# Patient Record
Sex: MALE | Race: ASIAN | Employment: FULL TIME | URBAN - METROPOLITAN AREA
[De-identification: names, ages, dates, MRNs, and addresses within clinical notes are randomized per-mention and may not be internally consistent; named-entity substitution may affect disease eponyms.]

---

## 2017-01-05 ENCOUNTER — OFFICE VISIT (OUTPATIENT)
Dept: OTHER | Facility: HOSPITAL | Age: 32
End: 2017-01-05
Attending: PREVENTIVE MEDICINE

## 2017-01-05 DIAGNOSIS — Z57.8 EMPLOYEE EXPOSURE TO BODY FLUIDS: Primary | ICD-10-CM

## 2017-01-05 LAB
HBV SURFACE AB SER QL: REACTIVE
HBV SURFACE AB SERPL IA-ACNC: 32.2 MIU/ML
HEPATITIS C VIRUS AB INTERPRETATION: NONREACTIVE
HIVS EXPOSURE ONLY: NONREACTIVE

## 2017-01-05 PROCEDURE — 86706 HEP B SURFACE ANTIBODY: CPT

## 2017-01-05 PROCEDURE — 86703 HIV-1/HIV-2 1 RESULT ANTBDY: CPT

## 2017-01-05 PROCEDURE — 86803 HEPATITIS C AB TEST: CPT

## 2017-01-05 SDOH — HEALTH STABILITY - PHYSICAL HEALTH: OCCUPATIONAL EXPOSURE TO OTHER RISK FACTORS: Z57.8

## 2017-04-30 ENCOUNTER — HOSPITAL ENCOUNTER (OUTPATIENT)
Age: 32
Discharge: HOME | End: 2017-04-30
Payer: COMMERCIAL

## 2017-04-30 VITALS
RESPIRATION RATE: 18 BRPM | DIASTOLIC BLOOD PRESSURE: 68 MMHG | HEIGHT: 71 IN | WEIGHT: 120 LBS | BODY MASS INDEX: 16.8 KG/M2 | TEMPERATURE: 99 F | SYSTOLIC BLOOD PRESSURE: 129 MMHG | OXYGEN SATURATION: 99 % | HEART RATE: 78 BPM

## 2017-04-30 DIAGNOSIS — K62.5 RECTAL BLEEDING: ICD-10-CM

## 2017-04-30 DIAGNOSIS — K64.9 HEMORRHOIDS, UNSPECIFIED HEMORRHOID TYPE: Primary | ICD-10-CM

## 2017-04-30 PROCEDURE — 99203 OFFICE O/P NEW LOW 30 MIN: CPT

## 2017-04-30 PROCEDURE — 82270 OCCULT BLOOD FECES: CPT | Performed by: PHYSICIAN ASSISTANT

## 2017-04-30 PROCEDURE — 82272 OCCULT BLD FECES 1-3 TESTS: CPT

## 2017-04-30 PROCEDURE — 36415 COLL VENOUS BLD VENIPUNCTURE: CPT

## 2017-04-30 PROCEDURE — 99213 OFFICE O/P EST LOW 20 MIN: CPT

## 2017-04-30 PROCEDURE — 85025 COMPLETE CBC W/AUTO DIFF WBC: CPT

## 2017-04-30 NOTE — ED INITIAL ASSESSMENT (HPI)
Pt reports black tarry stool for a couple of days. Also reports BRB streaks in the stools. Pt denies any recent NSAID use.

## 2017-04-30 NOTE — ED PROVIDER NOTES
Patient Seen in: [facility] Immediate Care In [facility]    History   Patient presents with:  Bleeding (hematologic)    Stated Complaint: rectal bleeding    HPI    33 yo male here with c/o 2-3 day history of blood streaked /tarry stools.  PT denies chest caroline Current:/68 mmHg  Pulse 78  Temp(Src) 98.8 °F (37.1 °C) (Temporal)  Resp 18  Ht 180.3 cm (5' 11\")  Wt 54.432 kg  BMI 16.74 kg/m2  SpO2 99%        Physical Exam   Constitutional: He is oriented to person, place, and time.  He appears well-develo MACK Ribeiro [facility] [address]  [phone]    Schedule an appointment as soon as possible for a visit  If symptoms worsen and/or for F/U    Danie Barahona MD  [address] [facility] [address]  [phone]    Schedule an appointm

## 2017-06-02 ENCOUNTER — HOSPITAL ENCOUNTER (EMERGENCY)
Facility: HOSPITAL | Age: 32
Discharge: HOME | End: 2017-06-03
Attending: EMERGENCY MEDICINE
Payer: COMMERCIAL

## 2017-06-02 ENCOUNTER — OFFICE VISIT (OUTPATIENT)
Dept: FAMILY MEDICINE CLINIC | Facility: CLINIC | Age: 32
End: 2017-06-02

## 2017-06-02 ENCOUNTER — TELEPHONE (OUTPATIENT)
Dept: FAMILY MEDICINE CLINIC | Facility: CLINIC | Age: 32
End: 2017-06-02

## 2017-06-02 VITALS
RESPIRATION RATE: 20 BRPM | HEART RATE: 84 BPM | WEIGHT: 118.5 LBS | OXYGEN SATURATION: 100 % | BODY MASS INDEX: 17 KG/M2 | DIASTOLIC BLOOD PRESSURE: 63 MMHG | SYSTOLIC BLOOD PRESSURE: 114 MMHG | TEMPERATURE: 99 F

## 2017-06-02 DIAGNOSIS — M54.41 LUMBAGO WITH SCIATICA, RIGHT SIDE: Primary | ICD-10-CM

## 2017-06-02 PROCEDURE — 96374 THER/PROPH/DIAG INJ IV PUSH: CPT

## 2017-06-02 PROCEDURE — 99213 OFFICE O/P EST LOW 20 MIN: CPT | Performed by: FAMILY MEDICINE

## 2017-06-02 PROCEDURE — 96375 TX/PRO/DX INJ NEW DRUG ADDON: CPT

## 2017-06-02 PROCEDURE — 96376 TX/PRO/DX INJ SAME DRUG ADON: CPT

## 2017-06-02 PROCEDURE — 99284 EMERGENCY DEPT VISIT MOD MDM: CPT

## 2017-06-02 PROCEDURE — 99285 EMERGENCY DEPT VISIT HI MDM: CPT

## 2017-06-02 RX ORDER — KETOROLAC TROMETHAMINE 30 MG/ML
30 INJECTION, SOLUTION INTRAMUSCULAR; INTRAVENOUS ONCE
Status: COMPLETED | OUTPATIENT
Start: 2017-06-02 | End: 2017-06-02

## 2017-06-02 RX ORDER — HYDROMORPHONE HYDROCHLORIDE 1 MG/ML
0.5 INJECTION, SOLUTION INTRAMUSCULAR; INTRAVENOUS; SUBCUTANEOUS EVERY 30 MIN PRN
Status: DISCONTINUED | OUTPATIENT
Start: 2017-06-02 | End: 2017-06-03

## 2017-06-02 RX ORDER — ONDANSETRON 2 MG/ML
4 INJECTION INTRAMUSCULAR; INTRAVENOUS EVERY 4 HOURS PRN
Status: DISCONTINUED | OUTPATIENT
Start: 2017-06-02 | End: 2017-06-03

## 2017-06-02 RX ORDER — IBUPROFEN 200 MG
200 TABLET ORAL EVERY 6 HOURS PRN
COMMUNITY
End: 2017-06-28 | Stop reason: ALTCHOICE

## 2017-06-02 RX ORDER — METHYLPREDNISOLONE 4 MG/1
TABLET ORAL
Qty: 1 KIT | Refills: 0 | Status: SHIPPED | OUTPATIENT
Start: 2017-06-02 | End: 2017-06-28 | Stop reason: ALTCHOICE

## 2017-06-02 RX ORDER — HYDROMORPHONE HYDROCHLORIDE 1 MG/ML
0.5 INJECTION, SOLUTION INTRAMUSCULAR; INTRAVENOUS; SUBCUTANEOUS ONCE
Status: DISCONTINUED | OUTPATIENT
Start: 2017-06-02 | End: 2017-06-03

## 2017-06-02 NOTE — PROGRESS NOTES
Raya Harper is a 34year old male. Patient presents with:  Low Back Pain: Last night pain started in lower back, states this happen about three years ago as well.      HPI:   Patient complaining of low back pain since last night, patient states the Elizabet Sauer was seen today for low back pain. Diagnoses and all orders for this visit:    Lumbago with sciatica, right side  -     methylPREDNISolone (MEDROL) 4 MG Oral Tablet Therapy Pack; As directed.   -     Physical Therapy Referral - Cristal Cisneros  E

## 2017-06-02 NOTE — TELEPHONE ENCOUNTER
Spoke to patient says no injury just happened yesterday. This also happened in 2014 for which he had PT and it helped. Says he took 2 Motrin advised he can take one more until appointment and can also alternate tylenol and use heat or ice.     Future Appoin

## 2017-06-03 VITALS
HEIGHT: 70 IN | SYSTOLIC BLOOD PRESSURE: 110 MMHG | DIASTOLIC BLOOD PRESSURE: 67 MMHG | WEIGHT: 120 LBS | RESPIRATION RATE: 18 BRPM | HEART RATE: 75 BPM | TEMPERATURE: 99 F | BODY MASS INDEX: 17.18 KG/M2

## 2017-06-03 NOTE — ED PROVIDER NOTES
Patient Seen in: [facility] Emergency Department    History   Patient presents with:  Back Pain (musculoskeletal)    Stated Complaint: back pain    HPI  Patient is a 39-year-old male here with back pain radiating down his right leg.   Started this mo stated in HPI.     Physical Exam       ED Triage Vitals   BP 06/02/17 2011 124/82 mmHg   Pulse 06/02/17 2011 111   Resp 06/02/17 2011 20   Temp 06/02/17 2011 99.1 °F (37.3 °C)   Temp src 06/02/17 2011 Temporal   SpO2 --    O2 Device --        Current: week  Return to the ER if you feel worse in any way, Return to the ER if you have any concerns      Medications Prescribed:  Current Discharge Medication List

## 2017-06-05 ENCOUNTER — APPOINTMENT (OUTPATIENT)
Dept: PHYSICAL THERAPY | Facility: HOSPITAL | Age: 32
End: 2017-06-05
Attending: FAMILY MEDICINE
Payer: COMMERCIAL

## 2017-06-07 ENCOUNTER — HOSPITAL ENCOUNTER (OUTPATIENT)
Dept: PHYSICAL THERAPY | Facility: HOSPITAL | Age: 32
Setting detail: THERAPIES SERIES
Discharge: HOME | End: 2017-06-07
Attending: FAMILY MEDICINE
Payer: COMMERCIAL

## 2017-06-07 DIAGNOSIS — M54.41 LUMBAGO WITH SCIATICA, RIGHT SIDE: Primary | ICD-10-CM

## 2017-06-07 PROCEDURE — 97140 MANUAL THERAPY 1/> REGIONS: CPT

## 2017-06-07 PROCEDURE — 97110 THERAPEUTIC EXERCISES: CPT

## 2017-06-07 PROCEDURE — 97161 PT EVAL LOW COMPLEX 20 MIN: CPT

## 2017-06-07 NOTE — PROGRESS NOTES
SPINE EVALUATION:   Referring Physician: Dr. Carter Cespedes  Diagnosis: Antony Bib with sciatica, right side (M54.41)     Date of Service: 6/7/2017     PATIENT SUMMARY   Meena Warren is a 34year old y/o male who presents to therapy today with complaints o coordination impairments.   Ian Biggs would benefit from skilled Physical Therapy to address the above impairments to facilitate pain-free participation in daily activities    Precautions:  None  OBJECTIVE:   Observation/Posture: unremarkable  Gait: unremarkab radicular symptoms for 3 consecutive days to improve function with ADL (10 visits)  · Pt will have decreased paraspinal mm tension for improved lumbar mobility to tolerate standing >30 minutes for work and home activities (10 visits)  · Pt will demonstrate

## 2017-06-28 ENCOUNTER — OFFICE VISIT (OUTPATIENT)
Dept: FAMILY MEDICINE CLINIC | Facility: CLINIC | Age: 32
End: 2017-06-28

## 2017-06-28 VITALS
HEART RATE: 97 BPM | OXYGEN SATURATION: 98 % | TEMPERATURE: 100 F | DIASTOLIC BLOOD PRESSURE: 58 MMHG | SYSTOLIC BLOOD PRESSURE: 102 MMHG | BODY MASS INDEX: 17 KG/M2 | WEIGHT: 119 LBS

## 2017-06-28 DIAGNOSIS — J06.9 ACUTE URI: Primary | ICD-10-CM

## 2017-06-28 PROCEDURE — 99212 OFFICE O/P EST SF 10 MIN: CPT | Performed by: FAMILY MEDICINE

## 2017-06-28 NOTE — PROGRESS NOTES
Bishop Maldonado is a 34year old male. Patient presents with:  Fever: 3 days body pain and cough along with joint pain. HPI:   Patient complaining of fever of 100 for the past 2-3 days and cough and joint pains.  Has been taking tylenol and robitussi

## 2017-07-02 ENCOUNTER — APPOINTMENT (OUTPATIENT)
Dept: GENERAL RADIOLOGY | Age: 32
End: 2017-07-02
Attending: PHYSICIAN ASSISTANT
Payer: COMMERCIAL

## 2017-07-02 ENCOUNTER — HOSPITAL ENCOUNTER (OUTPATIENT)
Age: 32
Discharge: HOME | End: 2017-07-02
Payer: COMMERCIAL

## 2017-07-02 VITALS
BODY MASS INDEX: 16.66 KG/M2 | RESPIRATION RATE: 18 BRPM | HEIGHT: 71 IN | SYSTOLIC BLOOD PRESSURE: 120 MMHG | DIASTOLIC BLOOD PRESSURE: 71 MMHG | HEART RATE: 101 BPM | OXYGEN SATURATION: 100 % | TEMPERATURE: 99 F | WEIGHT: 119 LBS

## 2017-07-02 DIAGNOSIS — J40 BRONCHITIS: Primary | ICD-10-CM

## 2017-07-02 DIAGNOSIS — J30.9 ALLERGIC RHINITIS, UNSPECIFIED ALLERGIC RHINITIS TRIGGER, UNSPECIFIED RHINITIS SEASONALITY: ICD-10-CM

## 2017-07-02 LAB — POCT RAPID STREP: NEGATIVE

## 2017-07-02 PROCEDURE — 71020 XR CHEST PA + LAT CHEST (CPT=71020): CPT | Performed by: PHYSICIAN ASSISTANT

## 2017-07-02 PROCEDURE — 94640 AIRWAY INHALATION TREATMENT: CPT

## 2017-07-02 PROCEDURE — 99214 OFFICE O/P EST MOD 30 MIN: CPT

## 2017-07-02 PROCEDURE — 87430 STREP A AG IA: CPT | Performed by: PHYSICIAN ASSISTANT

## 2017-07-02 PROCEDURE — 87081 CULTURE SCREEN ONLY: CPT | Performed by: PHYSICIAN ASSISTANT

## 2017-07-02 RX ORDER — PREDNISONE 20 MG/1
40 TABLET ORAL DAILY
Qty: 10 TABLET | Refills: 0 | Status: SHIPPED | OUTPATIENT
Start: 2017-07-02 | End: 2017-07-07

## 2017-07-02 RX ORDER — ALBUTEROL SULFATE 90 UG/1
2 AEROSOL, METERED RESPIRATORY (INHALATION) EVERY 4 HOURS PRN
Qty: 1 INHALER | Refills: 0 | Status: SHIPPED | OUTPATIENT
Start: 2017-07-02 | End: 2017-08-01

## 2017-07-02 RX ORDER — IPRATROPIUM BROMIDE AND ALBUTEROL SULFATE 2.5; .5 MG/3ML; MG/3ML
3 SOLUTION RESPIRATORY (INHALATION) ONCE
Status: COMPLETED | OUTPATIENT
Start: 2017-07-02 | End: 2017-07-02

## 2017-07-02 RX ORDER — PREDNISONE 20 MG/1
60 TABLET ORAL ONCE
Status: COMPLETED | OUTPATIENT
Start: 2017-07-02 | End: 2017-07-02

## 2017-07-02 NOTE — ED PROVIDER NOTES
Patient Seen in: [facility] In [facility]    History   Patient presents with:  Fever (infectious)  Cough/URI  Sore Throat    Stated Complaint: Fever; cough; sore throat    HPI    35 yo male here with c/o sore throat pain and a productive coug 85  Resp: 16  Temp: 98.6 °F (37 °C)  Temp src: Oral  SpO2: 99 %  O2 Device: None (Room air)    Current:/71   Pulse 101   Temp 98.6 °F (37 °C) (Oral)   Resp 18   Ht 180.3 cm (5' 11\")   Wt 54 kg   SpO2 100%   BMI 16.60 kg/m²         Physical Exam   Co Approved by: Allan Rm MD            ============================================================  ED Course  ------------------------------------------------------------  MDM     Clinical Impression:bronchitis/allergic rhinitis  Course of Treatme

## 2017-09-30 ENCOUNTER — APPOINTMENT (OUTPATIENT)
Dept: GENERAL RADIOLOGY | Age: 32
End: 2017-09-30
Attending: PHYSICIAN ASSISTANT
Payer: COMMERCIAL

## 2017-09-30 ENCOUNTER — HOSPITAL ENCOUNTER (OUTPATIENT)
Age: 32
Discharge: HOME | End: 2017-09-30
Payer: COMMERCIAL

## 2017-09-30 VITALS
SYSTOLIC BLOOD PRESSURE: 110 MMHG | TEMPERATURE: 98 F | HEART RATE: 86 BPM | DIASTOLIC BLOOD PRESSURE: 60 MMHG | OXYGEN SATURATION: 100 % | RESPIRATION RATE: 18 BRPM

## 2017-09-30 DIAGNOSIS — S92.355A NONDISPLACED FRACTURE OF FIFTH METATARSAL BONE, LEFT FOOT, INITIAL ENCOUNTER FOR CLOSED FRACTURE: Primary | ICD-10-CM

## 2017-09-30 PROCEDURE — 28470 CLTX METATARSAL FX WO MNP EA: CPT

## 2017-09-30 PROCEDURE — 99214 OFFICE O/P EST MOD 30 MIN: CPT

## 2017-09-30 PROCEDURE — 73630 X-RAY EXAM OF FOOT: CPT | Performed by: PHYSICIAN ASSISTANT

## 2017-09-30 NOTE — ED NOTES
Splint intact to left leg applied. +CMS to left toes. Cap refill <2secs. Splint checked by Barbara Thompson. Instructed pt not to remove splint (and when to remove incase of an emergency). Circulation checks and splint care explained.  Ice/motrin/elevation/fo

## 2017-09-30 NOTE — ED PROVIDER NOTES
Patient Seen in: [facility] Immediate [facility] In [place]    History   Patient presents with:  Lower Extremity Injury (musculoskeletal)    Stated Complaint: LEFT ANKLE INJURY    HPI    CHIEF COMPLAINT: Left foot injury    HISTORY OF PRESENT ILLNESS: Patient Physical Exam   ED Triage Vitals [09/30/17 1332]  BP: 111/62  Pulse: 106  Resp: 20  Temp: 97.8 °F (36.6 °C)  Temp src: Temporal  SpO2: 97 %  O2 Device: None (Room air)    Current:/62   Pulse 106   Temp 97.8 °F (36.6 °C) (Temporal)   Resp 20   SpO2 97 PROCEDURE:  XR FOOT, COMPLETE (MIN 3 VIEWS), LEFT (CPT=73630)  TECHNIQUE:  AP, oblique, and lateral views were obtained. COMPARISON:  None.   INDICATIONS:  LEFT ANKLE INJURY  PATIENT STATED HISTORY: (As transcribed by Technologist)  Patient states he twist Schedule an appointment as soon as possible for a visit in 2 days  For reevaluation      Medications Prescribed:  Current Discharge Medication List

## 2017-10-02 ENCOUNTER — TELEPHONE (OUTPATIENT)
Dept: FAMILY MEDICINE CLINIC | Facility: CLINIC | Age: 32
End: 2017-10-02

## 2017-10-02 DIAGNOSIS — S89.92XA INJURY OF LEFT LOWER EXTREMITY, INITIAL ENCOUNTER: Primary | ICD-10-CM

## 2017-10-02 PROBLEM — S92.355A CLOSED NONDISPLACED FRACTURE OF FIFTH METATARSAL BONE OF LEFT FOOT, INITIAL ENCOUNTER: Status: ACTIVE | Noted: 2017-10-02

## 2017-10-02 NOTE — TELEPHONE ENCOUNTER
Pt was seen in [address] [place] [address] on Saturday with foot injury  FINDINGS:  On the lateral view there is a lucency at the base of the fifth metatarsal bone. This is not identified on the oblique or AP views. Joint spaces are well maintained. No other bony abnormalities.

## 2017-10-07 ENCOUNTER — HOSPITAL ENCOUNTER (EMERGENCY)
Facility: HOSPITAL | Age: 32
Discharge: HOME | End: 2017-10-07
Attending: EMERGENCY MEDICINE
Payer: COMMERCIAL

## 2017-10-07 ENCOUNTER — APPOINTMENT (OUTPATIENT)
Dept: CT IMAGING | Facility: HOSPITAL | Age: 32
End: 2017-10-07
Attending: EMERGENCY MEDICINE
Payer: COMMERCIAL

## 2017-10-07 ENCOUNTER — APPOINTMENT (OUTPATIENT)
Dept: GENERAL RADIOLOGY | Facility: HOSPITAL | Age: 32
End: 2017-10-07
Attending: EMERGENCY MEDICINE
Payer: COMMERCIAL

## 2017-10-07 ENCOUNTER — HOSPITAL ENCOUNTER (OUTPATIENT)
Age: 32
Discharge: ANOTHER HOSPITAL | End: 2017-10-07
Attending: FAMILY MEDICINE
Payer: COMMERCIAL

## 2017-10-07 VITALS
DIASTOLIC BLOOD PRESSURE: 71 MMHG | RESPIRATION RATE: 16 BRPM | SYSTOLIC BLOOD PRESSURE: 125 MMHG | OXYGEN SATURATION: 100 % | HEART RATE: 70 BPM | TEMPERATURE: 98 F

## 2017-10-07 VITALS
OXYGEN SATURATION: 99 % | RESPIRATION RATE: 16 BRPM | HEART RATE: 70 BPM | DIASTOLIC BLOOD PRESSURE: 65 MMHG | HEIGHT: 71 IN | TEMPERATURE: 99 F | BODY MASS INDEX: 16.8 KG/M2 | WEIGHT: 120 LBS | SYSTOLIC BLOOD PRESSURE: 109 MMHG

## 2017-10-07 DIAGNOSIS — G44.59 OTHER COMPLICATED HEADACHE SYNDROME: Primary | ICD-10-CM

## 2017-10-07 DIAGNOSIS — G43.009 MIGRAINE WITHOUT AURA AND WITHOUT STATUS MIGRAINOSUS, NOT INTRACTABLE: Primary | ICD-10-CM

## 2017-10-07 DIAGNOSIS — H53.8 BLURRED VISION: ICD-10-CM

## 2017-10-07 DIAGNOSIS — H53.9 VISUAL CHANGES: ICD-10-CM

## 2017-10-07 PROCEDURE — 99285 EMERGENCY DEPT VISIT HI MDM: CPT

## 2017-10-07 PROCEDURE — 85025 COMPLETE CBC W/AUTO DIFF WBC: CPT | Performed by: EMERGENCY MEDICINE

## 2017-10-07 PROCEDURE — 99215 OFFICE O/P EST HI 40 MIN: CPT

## 2017-10-07 PROCEDURE — 71010 XR CHEST AP PORTABLE  (CPT=71010): CPT | Performed by: EMERGENCY MEDICINE

## 2017-10-07 PROCEDURE — 93005 ELECTROCARDIOGRAM TRACING: CPT

## 2017-10-07 PROCEDURE — 93010 ELECTROCARDIOGRAM REPORT: CPT

## 2017-10-07 PROCEDURE — 80053 COMPREHEN METABOLIC PANEL: CPT | Performed by: EMERGENCY MEDICINE

## 2017-10-07 PROCEDURE — 70450 CT HEAD/BRAIN W/O DYE: CPT | Performed by: EMERGENCY MEDICINE

## 2017-10-07 PROCEDURE — 36415 COLL VENOUS BLD VENIPUNCTURE: CPT

## 2017-10-07 NOTE — ED INITIAL ASSESSMENT (HPI)
Pt w/ blurry vision w/ headache. Blurry vision lasted 40 minutes. Slight headache remains. Was at Immediate care and sent here.

## 2017-10-07 NOTE — ED PROVIDER NOTES
Patient Seen in: [facility] In [facility]    History   Patient presents with:  Headache    Stated Complaint: h/a dizziness     HPI    Patient is a pleasant 70-year-old male.   Patient arrives for evaluation of headache with associated visual c Smokeless tobacco: Never Used                      Alcohol use: No                Review of Systems    Positive for stated complaint: h/a dizziness   Other systems are as noted in HPI.   Constitut vital signs and his cranial nerves are intact. Disposition and Plan     Clinical Impression:  Other complicated headache syndrome  (primary encounter diagnosis)  Visual changes    Disposition: Ic to ed    Follow-up:  No follow-up provider specified.

## 2017-10-07 NOTE — ED INITIAL ASSESSMENT (HPI)
Pt presents to the immediate care due to headache and vision changes. Pt states last Saturday he was seen here for a foot injury. Reports on Sunday he developed left sided neck pain he believed to be from using the crutches.  Pt states he has had the left

## 2017-10-07 NOTE — ED PROVIDER NOTES
Patient Seen in: [facility] Emergency Department    History   Patient presents with:   Eye Visual Problem (opthalmic)  Headache (neurologic)    Stated Complaint: headache    HPI    56-year-old male comes in the hospital she complained having difficul %  O2 Device: None (Room air)    Current:/69   Pulse 74   Temp 98.8 °F (37.1 °C) (Temporal)   Resp 20   Ht 180.3 cm (5' 11\")   Wt 54.4 kg   SpO2 100%   BMI 16.74 kg/m²         Physical Exam    HEENT : NCAT, EOMI, PEERL, throat clear, neck supple, no metatarsal bone. This is not identified on the oblique or AP views. Joint spaces are well maintained. No other bony abnormalities. IMPRESSION: Thin lucency that is only visualized on the lateral view at the base of fifth metatarsal bone is noted.  This is n CHEST PA + LAT CHEST (BMJ=[id number]), 7/02/2017, 12:00. INDICATIONS:  headache  PATIENT STATED HISTORY: (As transcribed by Technologist)  Headache, today. FINDINGS:  The heart is normal in size. The lungs are clear of acute-appearing disease process.   The

## 2017-12-05 ENCOUNTER — TELEPHONE (OUTPATIENT)
Dept: FAMILY MEDICINE CLINIC | Facility: CLINIC | Age: 32
End: 2017-12-05

## 2017-12-05 NOTE — TELEPHONE ENCOUNTER
Requesting an appt. Abdominal pain. Not severe. Began this am.      Pt hung up before I could Transfer to Triage . Pls call.

## 2017-12-05 NOTE — TELEPHONE ENCOUNTER
Mild abdominal pain started last night 2. Not increasing No other s/s. Will go to ER if worse. PCP informed. Wanted appointment on Thursday.      Future Appointments  Date Time Provider Gaby Toro   12/7/2017 12:30 PM Parisa Monterroso MD [facility]

## 2017-12-05 NOTE — TELEPHONE ENCOUNTER
Pt called back and did not say he had just called. Asked to schedule an appt. for abdominal pain. I explained again that I need to have the Triage Nurse triage the call so that I am scheduling the appt appropriately.   Pt declined to leave a message f

## 2017-12-07 ENCOUNTER — TELEPHONE (OUTPATIENT)
Dept: FAMILY MEDICINE CLINIC | Facility: CLINIC | Age: 32
End: 2017-12-07

## 2017-12-07 NOTE — TELEPHONE ENCOUNTER
Patient called and LVM at 9:19 am stating he needed to cancel today's visit due to being called into work. He asked us to return his call to R/S. He stated he tried to call us earlier to cancel and left a message, but we have no message.

## 2017-12-07 NOTE — TELEPHONE ENCOUNTER
Called patient and LVM that I was returning his call to R/S today's visit. This will count as a late cancel. This is his first late cancel with our clinic. No charge.

## 2017-12-12 ENCOUNTER — APPOINTMENT (OUTPATIENT)
Dept: CT IMAGING | Age: 32
End: 2017-12-12
Attending: FAMILY MEDICINE
Payer: COMMERCIAL

## 2017-12-12 ENCOUNTER — HOSPITAL ENCOUNTER (OUTPATIENT)
Age: 32
Discharge: HOME | End: 2017-12-12
Attending: FAMILY MEDICINE
Payer: COMMERCIAL

## 2017-12-12 VITALS
HEART RATE: 79 BPM | TEMPERATURE: 98 F | SYSTOLIC BLOOD PRESSURE: 111 MMHG | RESPIRATION RATE: 18 BRPM | OXYGEN SATURATION: 98 % | DIASTOLIC BLOOD PRESSURE: 70 MMHG

## 2017-12-12 DIAGNOSIS — K52.9 ENTERITIS: ICD-10-CM

## 2017-12-12 DIAGNOSIS — R10.32 LLQ ABDOMINAL PAIN: Primary | ICD-10-CM

## 2017-12-12 PROCEDURE — 36415 COLL VENOUS BLD VENIPUNCTURE: CPT

## 2017-12-12 PROCEDURE — 99214 OFFICE O/P EST MOD 30 MIN: CPT

## 2017-12-12 PROCEDURE — 85025 COMPLETE CBC W/AUTO DIFF WBC: CPT | Performed by: FAMILY MEDICINE

## 2017-12-12 PROCEDURE — 74176 CT ABD & PELVIS W/O CONTRAST: CPT | Performed by: FAMILY MEDICINE

## 2017-12-12 PROCEDURE — 81002 URINALYSIS NONAUTO W/O SCOPE: CPT | Performed by: FAMILY MEDICINE

## 2017-12-12 PROCEDURE — 80047 BASIC METABLC PNL IONIZED CA: CPT

## 2017-12-13 NOTE — ED PROVIDER NOTES
Patient Seen in: [facility] Immediate Care In [facility]    History   Patient presents with:  Abdominal Pain    Stated Complaint: abdominal pain, x3days    HPI    This 76-year-old male presents to the office with complaint of left lower quadrant abdominal p src: Temporal  SpO2: 100 %  O2 Device: None (Room air)    Current:/80   Pulse 70   Temp 98.2 °F (36.8 °C) (Temporal)   Resp 18   SpO2 100%         Physical Exam    General: WH/WN/WD, in NAD, ambulating without difficulty, A and O times 3  HEAD: Normo techniques were used. Dose information is transmitted to the [facility] [employer]) NRDR ([address]) which includes the Dose Index Registry.   PATIENT STATED HISTORY: (As transcribed by Technologist)  Patient states to ha by: Lenore Arndt MD            ED Course as of Dec 12 2059  ------------------------------------------------------------       MDM       I discussed results of laboratory and CT scan with the patient. He appears to have a nonspecific enteritis.   The patient

## 2017-12-13 NOTE — ED INITIAL ASSESSMENT (HPI)
Pt onset Sunday left lower abdominal discomfort that radiates into left testicle at times. States has begun playing volleyball and other sports recently. Yesterday pain resolved until later after noon. Today began again 30 minutes after waking.   Point s

## 2017-12-29 ENCOUNTER — APPOINTMENT (OUTPATIENT)
Dept: GENERAL RADIOLOGY | Age: 32
End: 2017-12-29
Attending: PHYSICIAN ASSISTANT
Payer: COMMERCIAL

## 2017-12-29 ENCOUNTER — HOSPITAL ENCOUNTER (OUTPATIENT)
Age: 32
Discharge: HOME | End: 2017-12-29
Payer: COMMERCIAL

## 2017-12-29 VITALS
TEMPERATURE: 98 F | HEIGHT: 70 IN | DIASTOLIC BLOOD PRESSURE: 84 MMHG | BODY MASS INDEX: 17.18 KG/M2 | HEART RATE: 107 BPM | RESPIRATION RATE: 18 BRPM | OXYGEN SATURATION: 100 % | SYSTOLIC BLOOD PRESSURE: 118 MMHG | WEIGHT: 120 LBS

## 2017-12-29 DIAGNOSIS — R09.89 FOREIGN BODY SENSATION IN THROAT: Primary | ICD-10-CM

## 2017-12-29 DIAGNOSIS — R05.3 COUGH, PERSISTENT: ICD-10-CM

## 2017-12-29 PROCEDURE — 99213 OFFICE O/P EST LOW 20 MIN: CPT

## 2017-12-29 PROCEDURE — 71020 XR CHEST PA + LAT CHEST (CPT=71020): CPT | Performed by: PHYSICIAN ASSISTANT

## 2017-12-29 NOTE — ED INITIAL ASSESSMENT (HPI)
Pt began yesterday when he swallowed rice the wrong way, and began to cough,  Then last night got severe body aches and felt feverish, and continued to cough.   Pt still feels like there might be something in his throat

## 2017-12-29 NOTE — ED PROVIDER NOTES
Patient Seen in: [facility] Immediate Care In [facility]    History   Patient presents with:  Cough/URI    Stated Complaint: COUGH/URI    HPI    79-year-old male here with complaints of a 1 day history of potential foreign body sensation in his throat and l 17.22 kg/m²         Physical Exam   Constitutional: He is oriented to person, place, and time. He appears well-developed and well-nourished. HENT:   Head: Normocephalic and atraumatic.    Right Ear: Tympanic membrane, external ear and ear canal normal. take an over-the-counter antihistamine daily i.e. Allegra, Claritin and or Zyrtec. If symptoms persist contact your primary care physician for referral to pulmonology.       The patient is in good condition thru out treatment today and remains so upon disc

## 2018-01-02 ENCOUNTER — APPOINTMENT (OUTPATIENT)
Dept: GENERAL RADIOLOGY | Age: 33
End: 2018-01-02
Attending: FAMILY MEDICINE
Payer: COMMERCIAL

## 2018-01-02 ENCOUNTER — HOSPITAL ENCOUNTER (OUTPATIENT)
Age: 33
Discharge: HOME | End: 2018-01-02
Attending: FAMILY MEDICINE
Payer: COMMERCIAL

## 2018-01-02 VITALS
HEIGHT: 70 IN | BODY MASS INDEX: 22.9 KG/M2 | RESPIRATION RATE: 18 BRPM | HEART RATE: 93 BPM | WEIGHT: 160 LBS | OXYGEN SATURATION: 100 % | TEMPERATURE: 98 F | DIASTOLIC BLOOD PRESSURE: 88 MMHG | SYSTOLIC BLOOD PRESSURE: 144 MMHG

## 2018-01-02 DIAGNOSIS — J11.1 INFLUENZA: Primary | ICD-10-CM

## 2018-01-02 PROCEDURE — 71046 X-RAY EXAM CHEST 2 VIEWS: CPT | Performed by: FAMILY MEDICINE

## 2018-01-02 PROCEDURE — 99213 OFFICE O/P EST LOW 20 MIN: CPT

## 2018-01-02 PROCEDURE — 99214 OFFICE O/P EST MOD 30 MIN: CPT

## 2018-01-02 RX ORDER — OSELTAMIVIR PHOSPHATE 75 MG/1
75 CAPSULE ORAL 2 TIMES DAILY
Qty: 10 CAPSULE | Refills: 0 | Status: SHIPPED | OUTPATIENT
Start: 2018-01-02 | End: 2018-01-07

## 2018-01-02 RX ORDER — BENZONATATE 200 MG/1
200 CAPSULE ORAL 3 TIMES DAILY PRN
Qty: 30 CAPSULE | Refills: 0 | Status: SHIPPED | OUTPATIENT
Start: 2018-01-02 | End: 2018-02-01

## 2018-01-02 NOTE — ED PROVIDER NOTES
Patient Seen in: [facility] Immediate Care In [facility]    History   Patient presents with:  Fever (infectious)  Cough/URI    Stated Complaint: fever ha    HPI    28year old male presents for fever and headache.  Patient states he has abrupt onset of fever Nose normal.   Mouth/Throat: Oropharynx is clear and moist and mucous membranes are normal.   Eyes: Conjunctivae and EOM are normal. Pupils are equal, round, and reactive to light. Neck: Normal range of motion. Neck supple.    Cardiovascular: Normal rate,

## 2018-03-31 ENCOUNTER — OFFICE VISIT (OUTPATIENT)
Dept: OTOLARYNGOLOGY | Facility: CLINIC | Age: 33
End: 2018-03-31

## 2018-03-31 VITALS
DIASTOLIC BLOOD PRESSURE: 71 MMHG | HEART RATE: 71 BPM | SYSTOLIC BLOOD PRESSURE: 115 MMHG | BODY MASS INDEX: 17.5 KG/M2 | HEIGHT: 71 IN | WEIGHT: 125 LBS | RESPIRATION RATE: 16 BRPM

## 2018-03-31 DIAGNOSIS — J35.8 TONSIL STONE: Primary | ICD-10-CM

## 2018-03-31 PROCEDURE — 99243 OFF/OP CNSLTJ NEW/EST LOW 30: CPT | Performed by: OTOLARYNGOLOGY

## 2018-03-31 PROCEDURE — 99212 OFFICE O/P EST SF 10 MIN: CPT | Performed by: OTOLARYNGOLOGY

## 2018-03-31 NOTE — PROGRESS NOTES
Lani Ferrara is a 28year old male. Patient presents with:  Throat Problem: Per pt tonsils stones, denied pain in his throat    HPI:   He has been experiencing problems with tonsil stone intermittently over the last month.   Sometimes when he gets [facility] Normal Orientation - Oriented to time, place, person & situation. Appropriate mood and affect. Lymph Detail Normal Submental. Submandibular. Anterior cervical. Posterior cervical. Supraclavicular.    Eyes Normal Conjunctiva - Right: Normal, Left: Normal.

## 2018-05-24 ENCOUNTER — APPOINTMENT (OUTPATIENT)
Dept: GENERAL RADIOLOGY | Facility: HOSPITAL | Age: 33
End: 2018-05-24
Attending: EMERGENCY MEDICINE
Payer: COMMERCIAL

## 2018-05-24 ENCOUNTER — HOSPITAL ENCOUNTER (OUTPATIENT)
Age: 33
Discharge: ANOTHER HOSPITAL | End: 2018-05-24
Payer: COMMERCIAL

## 2018-05-24 ENCOUNTER — HOSPITAL ENCOUNTER (EMERGENCY)
Facility: HOSPITAL | Age: 33
Discharge: HOME | End: 2018-05-24
Attending: EMERGENCY MEDICINE
Payer: COMMERCIAL

## 2018-05-24 VITALS
BODY MASS INDEX: 17.5 KG/M2 | OXYGEN SATURATION: 100 % | TEMPERATURE: 98 F | HEART RATE: 63 BPM | WEIGHT: 125 LBS | SYSTOLIC BLOOD PRESSURE: 114 MMHG | DIASTOLIC BLOOD PRESSURE: 72 MMHG | HEIGHT: 71 IN | RESPIRATION RATE: 16 BRPM

## 2018-05-24 VITALS
RESPIRATION RATE: 16 BRPM | SYSTOLIC BLOOD PRESSURE: 121 MMHG | TEMPERATURE: 98 F | OXYGEN SATURATION: 98 % | DIASTOLIC BLOOD PRESSURE: 71 MMHG | HEART RATE: 87 BPM

## 2018-05-24 DIAGNOSIS — M54.59 INTRACTABLE LOW BACK PAIN: Primary | ICD-10-CM

## 2018-05-24 DIAGNOSIS — M54.50 ACUTE MIDLINE LOW BACK PAIN WITHOUT SCIATICA: Primary | ICD-10-CM

## 2018-05-24 PROCEDURE — 72110 X-RAY EXAM L-2 SPINE 4/>VWS: CPT | Performed by: EMERGENCY MEDICINE

## 2018-05-24 PROCEDURE — 96376 TX/PRO/DX INJ SAME DRUG ADON: CPT

## 2018-05-24 PROCEDURE — 99284 EMERGENCY DEPT VISIT MOD MDM: CPT

## 2018-05-24 PROCEDURE — 96374 THER/PROPH/DIAG INJ IV PUSH: CPT

## 2018-05-24 PROCEDURE — 96372 THER/PROPH/DIAG INJ SC/IM: CPT

## 2018-05-24 PROCEDURE — 99214 OFFICE O/P EST MOD 30 MIN: CPT

## 2018-05-24 PROCEDURE — 96375 TX/PRO/DX INJ NEW DRUG ADDON: CPT

## 2018-05-24 PROCEDURE — 81003 URINALYSIS AUTO W/O SCOPE: CPT | Performed by: EMERGENCY MEDICINE

## 2018-05-24 RX ORDER — HYDROMORPHONE HYDROCHLORIDE 1 MG/ML
0.5 INJECTION, SOLUTION INTRAMUSCULAR; INTRAVENOUS; SUBCUTANEOUS EVERY 30 MIN PRN
Status: DISCONTINUED | OUTPATIENT
Start: 2018-05-24 | End: 2018-05-24

## 2018-05-24 RX ORDER — ONDANSETRON 2 MG/ML
4 INJECTION INTRAMUSCULAR; INTRAVENOUS ONCE
Status: COMPLETED | OUTPATIENT
Start: 2018-05-24 | End: 2018-05-24

## 2018-05-24 RX ORDER — KETOROLAC TROMETHAMINE 30 MG/ML
30 INJECTION, SOLUTION INTRAMUSCULAR; INTRAVENOUS ONCE
Status: COMPLETED | OUTPATIENT
Start: 2018-05-24 | End: 2018-05-24

## 2018-05-24 RX ORDER — PREDNISONE 20 MG/1
60 TABLET ORAL ONCE
Status: COMPLETED | OUTPATIENT
Start: 2018-05-24 | End: 2018-05-24

## 2018-05-24 RX ORDER — METHYLPREDNISOLONE 4 MG/1
TABLET ORAL
Qty: 1 PACKAGE | Refills: 0 | Status: SHIPPED | OUTPATIENT
Start: 2018-05-24 | End: 2018-05-29

## 2018-05-24 NOTE — ED INITIAL ASSESSMENT (HPI)
Patient presents with cc of low back pain from about an hour prior to admit when he bent down to  his child and tweaked his lower back into spasm. No numbness or tingling noted. Denies bowel/bladder incontinence.

## 2018-05-24 NOTE — ED INITIAL ASSESSMENT (HPI)
Patient bent down to  his daughter and experienced low back pain he went to the immediate care after  Attempting home remedies (heat,bengay) They gave him toradol and told him to come here

## 2018-05-24 NOTE — ED PROVIDER NOTES
Patient Seen in: [facility] Emergency Department    History   Patient presents with:  Back Pain (musculoskeletal)    Stated Complaint: Back pain while picking up daughter.  SEnt to ER from urgent care for pain contr*    HPI    This is a 68-year-old m nontoxic appearing. SKIN: Normal, warm, and dry. HEENT:  Pupils equally round and reactive to light. Conjuctiva clear. Oropharynx is clear and moist.   Lungs: Clear to auscultation bilaterally with no rales, no retractions, and no wheezing.   HEART:  Re orally. X-rays of the lumbar spine do not show any acute process. Suspect a lumbar radiculopathy. Recommend close outpatient follow-up with primary care and/or orthopedics. He was started on a Medrol Dosepak, Tylenol for pain.   He declined a Norco.  He

## 2018-05-24 NOTE — ED PROVIDER NOTES
Patient Seen in: [address]    History   Patient presents with:  Low Back Pain    Stated Complaint: back pain, xtoday     HPI    Patient is a 78-year-old male with a medical history of anorexia and chronic underweight.   Just simona is intact. DTRs are appropriate and equal.  Abdominal: Soft exam.  No distention. No pain to palpation all 4 quadrants. No evidence of hernia to the inguinal region  Back: Patient is sitting very erect in room.   He has right Paralumbar spasm to palpatio

## 2018-05-30 ENCOUNTER — APPOINTMENT (OUTPATIENT)
Dept: PHYSICAL THERAPY | Age: 33
End: 2018-05-30
Attending: FAMILY MEDICINE
Payer: COMMERCIAL

## 2018-05-30 ENCOUNTER — OFFICE VISIT (OUTPATIENT)
Dept: FAMILY MEDICINE CLINIC | Facility: CLINIC | Age: 33
End: 2018-05-30

## 2018-05-30 ENCOUNTER — OFFICE VISIT (OUTPATIENT)
Dept: PHYSICAL THERAPY | Age: 33
End: 2018-05-30
Attending: FAMILY MEDICINE
Payer: COMMERCIAL

## 2018-05-30 VITALS
WEIGHT: 122 LBS | DIASTOLIC BLOOD PRESSURE: 64 MMHG | RESPIRATION RATE: 16 BRPM | SYSTOLIC BLOOD PRESSURE: 102 MMHG | OXYGEN SATURATION: 99 % | BODY MASS INDEX: 17.66 KG/M2 | HEART RATE: 80 BPM | HEIGHT: 69.5 IN | TEMPERATURE: 98 F

## 2018-05-30 DIAGNOSIS — M54.41 ACUTE BILATERAL LOW BACK PAIN WITH RIGHT-SIDED SCIATICA: ICD-10-CM

## 2018-05-30 DIAGNOSIS — M62.830 SPASM OF MUSCLE OF LOWER BACK: ICD-10-CM

## 2018-05-30 DIAGNOSIS — M54.41 ACUTE BILATERAL LOW BACK PAIN WITH RIGHT-SIDED SCIATICA: Primary | ICD-10-CM

## 2018-05-30 PROCEDURE — 97110 THERAPEUTIC EXERCISES: CPT

## 2018-05-30 PROCEDURE — 99213 OFFICE O/P EST LOW 20 MIN: CPT | Performed by: FAMILY MEDICINE

## 2018-05-30 PROCEDURE — 97161 PT EVAL LOW COMPLEX 20 MIN: CPT

## 2018-05-30 RX ORDER — CYCLOBENZAPRINE HCL 10 MG
10 TABLET ORAL NIGHTLY
Qty: 30 TABLET | Refills: 0 | Status: SHIPPED | OUTPATIENT
Start: 2018-05-30 | End: 2018-06-29

## 2018-05-30 NOTE — PROGRESS NOTES
Akanksha Covarrubias is a 28year old male. Patient presents with:  ER F/U: Pt went to er for back going out about a week ago was trying to  his daughter and his back went out, patient was given pain medication.     HPI:   Patient is seen for follow-u for er f/u. Diagnoses and all orders for this visit:    Acute bilateral low back pain with right-sided sciatica  -     OP REFERRAL TO [facility]    Spasm of muscle of lower back  -     Cyclobenzaprine HCl 10 MG Oral Tab;  Take 1 tabl

## 2018-05-30 NOTE — PROGRESS NOTES
INITIAL EVALUATION:   Referring Physician: Dr. Alonzo Calero  Diagnosis: Acute bilateral low back pain with right-sided sciatica (M54.41)  Spasm of muscle of lower back (W86.840)       Date of Service: 5/30/2018     PATIENT SUMMARY/ASSESSMENT   Stacy Sims lordosis    Gait: The patient ambulates with a rigid and slightly forward flexed posture    ROM:  Flexion:  Moderate limitations  Extension: Severe limitations  Sidebending: R Minimal limitations; L Minimal limitations  Rotation: R WFL; L WFL    Accessory m treatment options and has agreed to actively participate in planning and for this course of care. Thank you for your referral. Please co-sign or sign and return this letter via fax as soon as possible to [phone].  If you have any questions, please c

## 2018-07-30 ENCOUNTER — OFFICE VISIT (OUTPATIENT)
Dept: FAMILY MEDICINE CLINIC | Facility: CLINIC | Age: 33
End: 2018-07-30
Payer: COMMERCIAL

## 2018-07-30 VITALS
SYSTOLIC BLOOD PRESSURE: 118 MMHG | TEMPERATURE: 98 F | WEIGHT: 122.25 LBS | HEART RATE: 76 BPM | DIASTOLIC BLOOD PRESSURE: 76 MMHG | RESPIRATION RATE: 16 BRPM | OXYGEN SATURATION: 98 % | BODY MASS INDEX: 18 KG/M2

## 2018-07-30 DIAGNOSIS — R22.32 LOCALIZED SWELLING, MASS, OR LUMP OF LEFT UPPER EXTREMITY: Primary | ICD-10-CM

## 2018-07-30 PROCEDURE — 99212 OFFICE O/P EST SF 10 MIN: CPT | Performed by: FAMILY MEDICINE

## 2018-07-30 NOTE — PROGRESS NOTES
Faith Cage is a 28year old male.   Patient presents with:  Arm Pain: Lump on right arm and tender to touch notoced a couple weeks ago then went away and now has come back     HPI:   Patient complaining that he noticed a small lump/swelling, soft in

## 2019-07-05 ENCOUNTER — HOSPITAL ENCOUNTER (OUTPATIENT)
Age: 34
Discharge: HOME | End: 2019-07-05
Attending: FAMILY MEDICINE
Payer: COMMERCIAL

## 2019-07-05 VITALS
OXYGEN SATURATION: 100 % | SYSTOLIC BLOOD PRESSURE: 129 MMHG | TEMPERATURE: 98 F | RESPIRATION RATE: 16 BRPM | DIASTOLIC BLOOD PRESSURE: 76 MMHG | HEART RATE: 72 BPM

## 2019-07-05 DIAGNOSIS — H92.02 LEFT EAR PAIN: Primary | ICD-10-CM

## 2019-07-05 DIAGNOSIS — R59.0 LYMPHADENOPATHY, PREAURICULAR: ICD-10-CM

## 2019-07-05 PROCEDURE — 99214 OFFICE O/P EST MOD 30 MIN: CPT

## 2019-07-05 PROCEDURE — 99213 OFFICE O/P EST LOW 20 MIN: CPT

## 2019-07-05 RX ORDER — METHYLPREDNISOLONE 4 MG/1
TABLET ORAL
Qty: 1 PACKAGE | Refills: 0 | Status: SHIPPED | OUTPATIENT
Start: 2019-07-05 | End: 2019-11-01

## 2019-07-06 NOTE — ED PROVIDER NOTES
Patient Seen in: [facility] Immediate Care In [facility]    History   Patient presents with:  Ear Problem Pain (neurosensory)    Stated Complaint: BILATERAL EAR PAIN/HEADACHE X 7 DAYS    HPI    27year old male presents for bilateral ear pain and headache. membrane, external ear and ear canal normal.   Nose: Nose normal.   Mouth/Throat: Uvula is midline, oropharynx is clear and moist and mucous membranes are normal.   Eyes: Pupils are equal, round, and reactive to light.  Conjunctivae, EOM and lids are normal

## 2019-11-01 ENCOUNTER — HOSPITAL ENCOUNTER (OUTPATIENT)
Age: 34
Discharge: HOME | End: 2019-11-01
Attending: FAMILY MEDICINE
Payer: COMMERCIAL

## 2019-11-01 VITALS
BODY MASS INDEX: 17.5 KG/M2 | TEMPERATURE: 99 F | OXYGEN SATURATION: 100 % | HEIGHT: 71 IN | WEIGHT: 125 LBS | RESPIRATION RATE: 20 BRPM | HEART RATE: 79 BPM | DIASTOLIC BLOOD PRESSURE: 70 MMHG | SYSTOLIC BLOOD PRESSURE: 125 MMHG

## 2019-11-01 DIAGNOSIS — J02.9 ACUTE VIRAL PHARYNGITIS: Primary | ICD-10-CM

## 2019-11-01 PROCEDURE — 87081 CULTURE SCREEN ONLY: CPT | Performed by: FAMILY MEDICINE

## 2019-11-01 PROCEDURE — 99214 OFFICE O/P EST MOD 30 MIN: CPT

## 2019-11-01 PROCEDURE — 87430 STREP A AG IA: CPT | Performed by: FAMILY MEDICINE

## 2019-11-01 RX ORDER — PREDNISONE 20 MG/1
40 TABLET ORAL DAILY
Qty: 6 TABLET | Refills: 0 | Status: SHIPPED | OUTPATIENT
Start: 2019-11-01 | End: 2019-11-04

## 2019-11-01 RX ORDER — BENZONATATE 100 MG/1
100 CAPSULE ORAL 3 TIMES DAILY PRN
Qty: 30 CAPSULE | Refills: 0 | Status: SHIPPED | OUTPATIENT
Start: 2019-11-01 | End: 2019-12-01

## 2019-11-01 NOTE — ED PROVIDER NOTES
Patient Seen in: [facility] In [facility]      History   Patient presents with:  Sore Throat    Stated Complaint: FEVER/SORE THROAT X 3 DAYS    HPI    41-year-old male presents the IC secondary to sore throat and fever.   Patient developed sym clear.   Nose: Bilateral nares are clear. Mouth: MMM. Posterior pharynx with bilateral tonsils +1. Erythematous tonsils with white exudate. Uvula is midline. Neck: Supple, NT, FROM. No meningeal signs.   LAD: Bilateral tender submandibular lymphadenopat

## 2020-01-13 ENCOUNTER — HOSPITAL ENCOUNTER (OUTPATIENT)
Age: 35
Discharge: HOME | End: 2020-01-13
Attending: FAMILY MEDICINE
Payer: COMMERCIAL

## 2020-01-13 VITALS
DIASTOLIC BLOOD PRESSURE: 68 MMHG | WEIGHT: 125 LBS | SYSTOLIC BLOOD PRESSURE: 99 MMHG | BODY MASS INDEX: 17.5 KG/M2 | TEMPERATURE: 98 F | RESPIRATION RATE: 19 BRPM | HEIGHT: 71 IN | HEART RATE: 101 BPM | OXYGEN SATURATION: 98 %

## 2020-01-13 DIAGNOSIS — J11.1 INFLUENZA: Primary | ICD-10-CM

## 2020-01-13 DIAGNOSIS — J02.9 SORE THROAT: ICD-10-CM

## 2020-01-13 LAB — POCT RAPID STREP: NEGATIVE

## 2020-01-13 PROCEDURE — 87081 CULTURE SCREEN ONLY: CPT | Performed by: FAMILY MEDICINE

## 2020-01-13 PROCEDURE — 87430 STREP A AG IA: CPT | Performed by: FAMILY MEDICINE

## 2020-01-13 PROCEDURE — 99214 OFFICE O/P EST MOD 30 MIN: CPT

## 2020-01-13 RX ORDER — FLUTICASONE PROPIONATE 50 MCG
SPRAY, SUSPENSION (ML) NASAL
Qty: 1 INHALER | Refills: 0 | Status: SHIPPED | OUTPATIENT
Start: 2020-01-13

## 2020-01-13 RX ORDER — BENZONATATE 200 MG/1
200 CAPSULE ORAL 3 TIMES DAILY PRN
Qty: 30 CAPSULE | Refills: 0 | Status: SHIPPED | OUTPATIENT
Start: 2020-01-13 | End: 2020-01-23

## 2020-01-13 NOTE — ED INITIAL ASSESSMENT (HPI)
3 days of sore throat  Body aches  Fever  Productive cough  Sinus pressure and congestion  Runny nose

## 2020-01-13 NOTE — ED PROVIDER NOTES
Patient Seen in: [facility]      History   Patient presents with:  Sore Throat    Stated Complaint: cough,sore throat,fever    HPI    This 69-year-old male presents to the office with complaint of sore throat, fever, chills, sin normal.  NOSE: Turbinates congested, no bleeding noted. PHARYNX:  Erythema is noted, no exudates seen, post nasal drip is noted, airway patent, uvula midline  NECK:  Shotty anterior cervical lymphadenopathy.  No thyromegaly,  HEART: Regular rate and rhythm least the next 2 weeks or as long as you are congested. Push fluids. Humidified air. Alternate Tylenol with ibuprofen every 3 hours while awake for fever or pain. Go to the emergency room if you have increased difficulty breathing.   Follow-up with your

## 2020-11-13 ENCOUNTER — HOSPITAL ENCOUNTER (OUTPATIENT)
Dept: GENERAL RADIOLOGY | Age: 35
Discharge: HOME | End: 2020-11-13
Attending: FAMILY MEDICINE

## 2020-11-13 ENCOUNTER — OCC HEALTH (OUTPATIENT)
Dept: OCCUPATIONAL MEDICINE | Age: 35
End: 2020-11-13
Attending: FAMILY MEDICINE

## 2020-11-13 DIAGNOSIS — M54.9 BACK PAIN: ICD-10-CM

## 2020-11-13 DIAGNOSIS — M54.9 BACK PAIN: Primary | ICD-10-CM

## 2020-11-13 PROCEDURE — 72072 X-RAY EXAM THORAC SPINE 3VWS: CPT | Performed by: FAMILY MEDICINE

## 2020-11-13 PROCEDURE — 71046 X-RAY EXAM CHEST 2 VIEWS: CPT | Performed by: FAMILY MEDICINE

## 2020-11-13 RX ORDER — CYCLOBENZAPRINE HCL 10 MG
10 TABLET ORAL NIGHTLY
Qty: 30 TABLET | Refills: 0 | Status: SHIPPED | OUTPATIENT
Start: 2020-11-13 | End: 2020-12-13

## 2020-11-13 RX ORDER — NAPROXEN 500 MG/1
500 TABLET ORAL 2 TIMES DAILY
Qty: 30 TABLET | Refills: 1 | Status: SHIPPED | OUTPATIENT
Start: 2020-11-13 | End: 2020-12-13

## 2020-12-17 ENCOUNTER — IMMUNIZATION (OUTPATIENT)
Dept: LAB | Facility: HOSPITAL | Age: 35
End: 2020-12-17
Attending: PREVENTIVE MEDICINE
Payer: COMMERCIAL

## 2020-12-17 DIAGNOSIS — Z23 NEED FOR VACCINATION: ICD-10-CM

## 2020-12-17 PROCEDURE — 0001A PFIZER-BIONTECH COVID-19 VACCINE: CPT

## 2021-01-07 ENCOUNTER — IMMUNIZATION (OUTPATIENT)
Dept: LAB | Facility: HOSPITAL | Age: 36
End: 2021-01-07
Attending: PREVENTIVE MEDICINE

## 2021-01-07 DIAGNOSIS — Z23 NEED FOR VACCINATION: ICD-10-CM

## 2021-01-07 PROCEDURE — 0002A SARSCOV2 VAC 30MCG/0.3ML IM: CPT

## 2021-05-20 ENCOUNTER — HOSPITAL ENCOUNTER (EMERGENCY)
Facility: HOSPITAL | Age: 36
Discharge: HOME | End: 2021-05-20
Attending: EMERGENCY MEDICINE

## 2021-05-20 VITALS
HEIGHT: 71 IN | BODY MASS INDEX: 20.3 KG/M2 | RESPIRATION RATE: 16 BRPM | OXYGEN SATURATION: 98 % | WEIGHT: 145 LBS | TEMPERATURE: 98 F | HEART RATE: 72 BPM | DIASTOLIC BLOOD PRESSURE: 81 MMHG | SYSTOLIC BLOOD PRESSURE: 128 MMHG

## 2021-05-20 DIAGNOSIS — M54.32 SCIATICA OF LEFT SIDE: Primary | ICD-10-CM

## 2021-05-20 PROCEDURE — 99282 EMERGENCY DEPT VISIT SF MDM: CPT

## 2021-05-20 RX ORDER — IBUPROFEN 200 MG
200 TABLET ORAL ONCE
Status: COMPLETED | OUTPATIENT
Start: 2021-05-20 | End: 2021-05-20

## 2021-05-20 RX ORDER — ACETAMINOPHEN 500 MG
500 TABLET ORAL ONCE
Status: COMPLETED | OUTPATIENT
Start: 2021-05-20 | End: 2021-05-20

## 2021-05-20 RX ORDER — KETOROLAC TROMETHAMINE 30 MG/ML
60 INJECTION, SOLUTION INTRAMUSCULAR; INTRAVENOUS ONCE
Status: DISCONTINUED | OUTPATIENT
Start: 2021-05-20 | End: 2021-05-20

## 2021-05-20 NOTE — ED PROVIDER NOTES
Patient Seen in: [facility] Emergency Department      History   Patient presents with:  Back Pain    Stated Complaint: left lower back pain radiates to left leg    HPI/Subjective:   HPI    56-year-old male who presents to the emergency department co Resp 16   Ht 180.3 cm (5' 11\")   Wt 65.8 kg   SpO2 100%   BMI 20.22 kg/m²         Physical Exam  General: This a pleasant nontoxic appearing patient in no apparent distress alert and oriented ×3  HEENT: His head is atraumatic normocephalic.   Pupils are r Kenroy Irene  [phone]    Schedule an appointment as soon as possible for a visit in 1 week            Medications Prescribed:  Current Discharge Medication List

## 2021-05-20 NOTE — ED QUICK NOTES
Pt reevaluated by er physician.  Informed of all his test reports and plan of care verbalizing understanding

## 2021-05-20 NOTE — ED INITIAL ASSESSMENT (HPI)
Pt states he worked with a lot of walking yesterday, here for Left lower back pain radiating to his leg. Denies injury.

## 2021-10-01 ENCOUNTER — IMMUNIZATION (OUTPATIENT)
Dept: LAB | Facility: HOSPITAL | Age: 36
End: 2021-10-01
Attending: EMERGENCY MEDICINE
Payer: COMMERCIAL

## 2021-10-01 DIAGNOSIS — Z23 NEED FOR VACCINATION: Primary | ICD-10-CM

## 2021-10-01 PROCEDURE — 0003A SARSCOV2 VAC 30MCG/0.3ML IM: CPT

## 2021-10-01 PROCEDURE — 0004A SARSCOV2 VAC 30MCG/0.3ML IM: CPT

## 2022-02-23 ENCOUNTER — HOSPITAL ENCOUNTER (EMERGENCY)
Facility: HOSPITAL | Age: 37
Discharge: HOME | End: 2022-02-23
Attending: PEDIATRICS
Payer: COMMERCIAL

## 2022-02-23 VITALS
BODY MASS INDEX: 20 KG/M2 | WEIGHT: 143 LBS | HEART RATE: 90 BPM | DIASTOLIC BLOOD PRESSURE: 77 MMHG | OXYGEN SATURATION: 100 % | RESPIRATION RATE: 18 BRPM | SYSTOLIC BLOOD PRESSURE: 123 MMHG | TEMPERATURE: 98 F
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
## 2022-02-23 DIAGNOSIS — S81.851A DOG BITE OF RIGHT LOWER LEG, INITIAL ENCOUNTER: Primary | ICD-10-CM

## 2022-02-23 PROCEDURE — 99283 EMERGENCY DEPT VISIT LOW MDM: CPT

## 2022-02-23 PROCEDURE — 90471 IMMUNIZATION ADMIN: CPT

## 2022-06-10 ENCOUNTER — APPOINTMENT (OUTPATIENT)
Dept: GENERAL RADIOLOGY | Facility: HOSPITAL | Age: 37
End: 2022-06-10
Attending: EMERGENCY MEDICINE
Payer: COMMERCIAL

## 2022-06-10 ENCOUNTER — HOSPITAL ENCOUNTER (EMERGENCY)
Facility: HOSPITAL | Age: 37
Discharge: HOME | End: 2022-06-10
Attending: EMERGENCY MEDICINE
Payer: COMMERCIAL

## 2022-06-10 VITALS
RESPIRATION RATE: 15 BRPM | WEIGHT: 140 LBS | DIASTOLIC BLOOD PRESSURE: 81 MMHG | TEMPERATURE: 98 F | SYSTOLIC BLOOD PRESSURE: 125 MMHG | BODY MASS INDEX: 20.04 KG/M2 | HEART RATE: 74 BPM | HEIGHT: 70 IN | OXYGEN SATURATION: 100 %

## 2022-06-10 DIAGNOSIS — R07.81 RIB PAIN ON LEFT SIDE: Primary | ICD-10-CM

## 2022-06-10 LAB
ALBUMIN SERPL-MCNC: 3.7 G/DL (ref 3.4–5)
ALBUMIN/GLOB SERPL: 0.9 {RATIO} (ref 1–2)
ALP LIVER SERPL-CCNC: 82 U/L
ALT SERPL-CCNC: 29 U/L
ANION GAP SERPL CALC-SCNC: 4 MMOL/L (ref 0–18)
AST SERPL-CCNC: 18 U/L (ref 15–37)
BASOPHILS # BLD AUTO: 0.05 X10(3) UL (ref 0–0.2)
BASOPHILS NFR BLD AUTO: 0.5 %
BILIRUB SERPL-MCNC: 0.4 MG/DL (ref 0.1–2)
BUN BLD-MCNC: 18 MG/DL (ref 7–18)
CALCIUM BLD-MCNC: 9.1 MG/DL (ref 8.5–10.1)
CHLORIDE SERPL-SCNC: 105 MMOL/L (ref 98–112)
CO2 SERPL-SCNC: 29 MMOL/L (ref 21–32)
CREAT BLD-MCNC: 1.22 MG/DL
D DIMER PPP FEU-MCNC: <0.27 UG/ML FEU (ref ?–0.5)
EOSINOPHIL # BLD AUTO: 0.31 X10(3) UL (ref 0–0.7)
EOSINOPHIL NFR BLD AUTO: 3.4 %
ERYTHROCYTE [DISTWIDTH] IN BLOOD BY AUTOMATED COUNT: 13.8 %
GLOBULIN PLAS-MCNC: 4 G/DL (ref 2.8–4.4)
GLUCOSE BLD-MCNC: 122 MG/DL (ref 70–99)
HCT VFR BLD AUTO: 45.7 %
HGB BLD-MCNC: 14.1 G/DL
IMM GRANULOCYTES # BLD AUTO: 0.02 X10(3) UL (ref 0–1)
IMM GRANULOCYTES NFR BLD: 0.2 %
LYMPHOCYTES # BLD AUTO: 2.41 X10(3) UL (ref 1–4)
LYMPHOCYTES NFR BLD AUTO: 26.3 %
MCH RBC QN AUTO: 25.4 PG (ref 26–34)
MCHC RBC AUTO-ENTMCNC: 30.9 G/DL (ref 31–37)
MCV RBC AUTO: 82.3 FL
MONOCYTES # BLD AUTO: 0.48 X10(3) UL (ref 0.1–1)
MONOCYTES NFR BLD AUTO: 5.2 %
NEUTROPHILS # BLD AUTO: 5.9 X10 (3) UL (ref 1.5–7.7)
NEUTROPHILS # BLD AUTO: 5.9 X10(3) UL (ref 1.5–7.7)
NEUTROPHILS NFR BLD AUTO: 64.4 %
OSMOLALITY SERPL CALC.SUM OF ELEC: 289 MOSM/KG (ref 275–295)
PLATELET # BLD AUTO: 170 10(3)UL (ref 150–450)
POTASSIUM SERPL-SCNC: 3.9 MMOL/L (ref 3.5–5.1)
PROT SERPL-MCNC: 7.7 G/DL (ref 6.4–8.2)
RBC # BLD AUTO: 5.55 X10(6)UL
SODIUM SERPL-SCNC: 138 MMOL/L (ref 136–145)
WBC # BLD AUTO: 9.2 X10(3) UL (ref 4–11)

## 2022-06-10 PROCEDURE — 71046 X-RAY EXAM CHEST 2 VIEWS: CPT | Performed by: EMERGENCY MEDICINE

## 2022-06-10 PROCEDURE — 99284 EMERGENCY DEPT VISIT MOD MDM: CPT

## 2022-06-10 PROCEDURE — 80053 COMPREHEN METABOLIC PANEL: CPT | Performed by: EMERGENCY MEDICINE

## 2022-06-10 PROCEDURE — 85025 COMPLETE CBC W/AUTO DIFF WBC: CPT | Performed by: EMERGENCY MEDICINE

## 2022-06-10 PROCEDURE — 85379 FIBRIN DEGRADATION QUANT: CPT | Performed by: EMERGENCY MEDICINE

## 2022-06-10 PROCEDURE — 96374 THER/PROPH/DIAG INJ IV PUSH: CPT

## 2022-06-10 RX ORDER — KETOROLAC TROMETHAMINE 30 MG/ML
15 INJECTION, SOLUTION INTRAMUSCULAR; INTRAVENOUS ONCE
Status: COMPLETED | OUTPATIENT
Start: 2022-06-10 | End: 2022-06-10

## 2022-06-10 RX ORDER — HYDROCODONE BITARTRATE AND ACETAMINOPHEN 5; 325 MG/1; MG/1
1 TABLET ORAL ONCE
Status: COMPLETED | OUTPATIENT
Start: 2022-06-10 | End: 2022-06-10

## 2022-06-10 RX ORDER — HYDROCODONE BITARTRATE AND ACETAMINOPHEN 5; 325 MG/1; MG/1
1-2 TABLET ORAL EVERY 6 HOURS PRN
Qty: 10 TABLET | Refills: 0 | Status: SHIPPED | OUTPATIENT
Start: 2022-06-10 | End: 2022-06-17

## 2022-06-10 NOTE — ED INITIAL ASSESSMENT (HPI)
Pt reports left sided rib pain that started Wednesday afternoon. Pt reports pain with movement and when he takes a deep breath. Pt reports that he tried tylenol and lidocaine patch with no relief, pt reports pain has gotten worse since Wednesday.

## 2022-06-23 ENCOUNTER — LAB ENCOUNTER (OUTPATIENT)
Dept: LAB | Age: 37
End: 2022-06-23
Attending: FAMILY MEDICINE
Payer: COMMERCIAL

## 2022-06-23 ENCOUNTER — OFFICE VISIT (OUTPATIENT)
Dept: FAMILY MEDICINE CLINIC | Facility: CLINIC | Age: 37
End: 2022-06-23
Payer: COMMERCIAL

## 2022-06-23 VITALS
BODY MASS INDEX: 20.19 KG/M2 | OXYGEN SATURATION: 100 % | RESPIRATION RATE: 18 BRPM | SYSTOLIC BLOOD PRESSURE: 100 MMHG | DIASTOLIC BLOOD PRESSURE: 80 MMHG | HEIGHT: 70 IN | HEART RATE: 71 BPM | WEIGHT: 141 LBS | TEMPERATURE: 98 F

## 2022-06-23 DIAGNOSIS — Z00.00 ROUTINE GENERAL MEDICAL EXAMINATION AT A HEALTH CARE FACILITY: Primary | ICD-10-CM

## 2022-06-23 DIAGNOSIS — Z00.00 ROUTINE GENERAL MEDICAL EXAMINATION AT A HEALTH CARE FACILITY: ICD-10-CM

## 2022-06-23 DIAGNOSIS — E55.9 VITAMIN D DEFICIENCY: ICD-10-CM

## 2022-06-23 LAB
CHOLEST SERPL-MCNC: 173 MG/DL (ref ?–200)
FASTING PATIENT LIPID ANSWER: YES
HDLC SERPL-MCNC: 32 MG/DL (ref 40–59)
LDLC SERPL CALC-MCNC: 111 MG/DL (ref ?–100)
NONHDLC SERPL-MCNC: 141 MG/DL (ref ?–130)
TRIGL SERPL-MCNC: 169 MG/DL (ref 30–149)
TSI SER-ACNC: 1.51 MIU/ML (ref 0.36–3.74)
VIT D+METAB SERPL-MCNC: 44.9 NG/ML (ref 30–100)
VLDLC SERPL CALC-MCNC: 29 MG/DL (ref 0–30)

## 2022-06-23 PROCEDURE — 99385 PREV VISIT NEW AGE 18-39: CPT | Performed by: FAMILY MEDICINE

## 2022-06-23 PROCEDURE — 3079F DIAST BP 80-89 MM HG: CPT | Performed by: FAMILY MEDICINE

## 2022-06-23 PROCEDURE — 36415 COLL VENOUS BLD VENIPUNCTURE: CPT

## 2022-06-23 PROCEDURE — 82306 VITAMIN D 25 HYDROXY: CPT

## 2022-06-23 PROCEDURE — 3074F SYST BP LT 130 MM HG: CPT | Performed by: FAMILY MEDICINE

## 2022-06-23 PROCEDURE — 80061 LIPID PANEL: CPT

## 2022-06-23 PROCEDURE — 3008F BODY MASS INDEX DOCD: CPT | Performed by: FAMILY MEDICINE

## 2022-06-23 PROCEDURE — 84443 ASSAY THYROID STIM HORMONE: CPT

## 2022-06-23 RX ORDER — BENZONATATE 100 MG/1
100 CAPSULE ORAL 2 TIMES DAILY PRN
COMMUNITY
Start: 2022-06-17

## 2022-06-24 ENCOUNTER — HOSPITAL ENCOUNTER (EMERGENCY)
Age: 37
Discharge: HOME | End: 2022-06-24
Payer: COMMERCIAL

## 2022-06-24 VITALS
OXYGEN SATURATION: 100 % | WEIGHT: 142 LBS | BODY MASS INDEX: 20 KG/M2 | SYSTOLIC BLOOD PRESSURE: 131 MMHG | HEART RATE: 90 BPM | TEMPERATURE: 98 F | RESPIRATION RATE: 18 BRPM | DIASTOLIC BLOOD PRESSURE: 64 MMHG

## 2022-06-24 DIAGNOSIS — M54.41 ACUTE RIGHT-SIDED LOW BACK PAIN WITH RIGHT-SIDED SCIATICA: Primary | ICD-10-CM

## 2022-06-24 PROCEDURE — 99283 EMERGENCY DEPT VISIT LOW MDM: CPT

## 2022-06-24 PROCEDURE — 96372 THER/PROPH/DIAG INJ SC/IM: CPT

## 2022-06-24 RX ORDER — CYCLOBENZAPRINE HCL 10 MG
10 TABLET ORAL ONCE
Status: COMPLETED | OUTPATIENT
Start: 2022-06-24 | End: 2022-06-24

## 2022-06-24 RX ORDER — CYCLOBENZAPRINE HCL 10 MG
10 TABLET ORAL 3 TIMES DAILY PRN
Qty: 20 TABLET | Refills: 0 | Status: SHIPPED | OUTPATIENT
Start: 2022-06-24 | End: 2022-07-01

## 2022-06-24 RX ORDER — METHYLPREDNISOLONE 4 MG/1
TABLET ORAL
Qty: 1 EACH | Refills: 0 | Status: SHIPPED | OUTPATIENT
Start: 2022-06-24

## 2022-06-24 RX ORDER — KETOROLAC TROMETHAMINE 30 MG/ML
60 INJECTION, SOLUTION INTRAMUSCULAR; INTRAVENOUS ONCE
Status: COMPLETED | OUTPATIENT
Start: 2022-06-24 | End: 2022-06-24

## 2022-06-24 NOTE — ED INITIAL ASSESSMENT (HPI)
Pt states that he has back pain after riding a bike for 10 miles. Pt denies any other injury.  Pt states that he has tried Motrin, tylenol, norco and lidocaine patch with no relief

## 2022-06-28 ENCOUNTER — HOSPITAL ENCOUNTER (OUTPATIENT)
Age: 37
Discharge: HOME | End: 2022-06-28
Attending: EMERGENCY MEDICINE
Payer: COMMERCIAL

## 2022-06-28 ENCOUNTER — APPOINTMENT (OUTPATIENT)
Dept: GENERAL RADIOLOGY | Age: 37
End: 2022-06-28
Attending: NURSE PRACTITIONER
Payer: COMMERCIAL

## 2022-06-28 VITALS
TEMPERATURE: 97 F | WEIGHT: 142 LBS | OXYGEN SATURATION: 99 % | DIASTOLIC BLOOD PRESSURE: 65 MMHG | HEART RATE: 80 BPM | RESPIRATION RATE: 18 BRPM | BODY MASS INDEX: 20.33 KG/M2 | SYSTOLIC BLOOD PRESSURE: 113 MMHG | HEIGHT: 70 IN

## 2022-06-28 DIAGNOSIS — R07.89 CHEST PAIN, NON-CARDIAC: Primary | ICD-10-CM

## 2022-06-28 LAB
#MXD IC: 0.4 X10ˆ3/UL (ref 0.1–1)
ATRIAL RATE: 68 BPM
BASOPHILS # BLD AUTO: 0.05 X10(3) UL (ref 0–0.2)
BASOPHILS NFR BLD AUTO: 0.7 %
BUN BLD-MCNC: 20 MG/DL (ref 7–18)
CHLORIDE BLD-SCNC: 101 MMOL/L (ref 98–112)
CO2 BLD-SCNC: 30 MMOL/L (ref 21–32)
CREAT BLD-MCNC: 1 MG/DL
DDIMER WHOLE BLOOD: <200 NG/ML DDU (ref ?–400)
EOSINOPHIL # BLD AUTO: 0.17 X10(3) UL (ref 0–0.7)
EOSINOPHIL NFR BLD AUTO: 2.3 %
ERYTHROCYTE [DISTWIDTH] IN BLOOD BY AUTOMATED COUNT: 13.5 %
GLUCOSE BLD-MCNC: 84 MG/DL (ref 70–99)
HCT VFR BLD AUTO: 44.6 %
HCT VFR BLD AUTO: 45 %
HCT VFR BLD CALC: 46 %
HGB BLD-MCNC: 13.7 G/DL
HGB BLD-MCNC: 14.1 G/DL
IMM GRANULOCYTES # BLD AUTO: 0.03 X10(3) UL (ref 0–1)
IMM GRANULOCYTES NFR BLD: 0.4 %
ISTAT IONIZED CALCIUM FOR CHEM 8: 1.21 MMOL/L (ref 1.12–1.32)
LYMPHOCYTES # BLD AUTO: 2.67 X10(3) UL (ref 1–4)
LYMPHOCYTES # BLD AUTO: 2.8 X10ˆ3/UL (ref 1–4)
LYMPHOCYTES NFR BLD AUTO: 35.6 %
LYMPHOCYTES NFR BLD AUTO: 38.5 %
MCH RBC QN AUTO: 25 PG (ref 26–34)
MCH RBC QN AUTO: 25.5 PG (ref 26–34)
MCHC RBC AUTO-ENTMCNC: 30.7 G/DL (ref 31–37)
MCHC RBC AUTO-ENTMCNC: 31.3 G/DL (ref 31–37)
MCV RBC AUTO: 81.2 FL
MCV RBC AUTO: 81.2 FL (ref 80–100)
MIXED CELL %: 5.7 %
MONOCYTES # BLD AUTO: 0.45 X10(3) UL (ref 0.1–1)
MONOCYTES NFR BLD AUTO: 6 %
NEUTROPHILS # BLD AUTO: 4.14 X10 (3) UL (ref 1.5–7.7)
NEUTROPHILS # BLD AUTO: 4.14 X10(3) UL (ref 1.5–7.7)
NEUTROPHILS # BLD AUTO: 4.2 X10ˆ3/UL (ref 1.5–7.7)
NEUTROPHILS NFR BLD AUTO: 55 %
NEUTROPHILS NFR BLD AUTO: 55.8 %
P AXIS: 66 DEGREES
P-R INTERVAL: 160 MS
PLATELET # BLD AUTO: 179 10(3)UL (ref 150–450)
POTASSIUM BLD-SCNC: 4.4 MMOL/L (ref 3.6–5.1)
Q-T INTERVAL: 348 MS
QRS DURATION: 90 MS
QTC CALCULATION (BEZET): 370 MS
R AXIS: 108 DEGREES
RBC # BLD AUTO: 5.49 X10ˆ6/UL
RBC # BLD AUTO: 5.54 X10(6)UL
SODIUM BLD-SCNC: 140 MMOL/L (ref 136–145)
T AXIS: 61 DEGREES
TROPONIN I BLD-MCNC: <0.02 NG/ML
VENTRICULAR RATE: 68 BPM
WBC # BLD AUTO: 7.4 X10ˆ3/UL (ref 4–11)
WBC # BLD AUTO: 7.5 X10(3) UL (ref 4–11)

## 2022-06-28 PROCEDURE — 84484 ASSAY OF TROPONIN QUANT: CPT

## 2022-06-28 PROCEDURE — 85025 COMPLETE CBC W/AUTO DIFF WBC: CPT | Performed by: NURSE PRACTITIONER

## 2022-06-28 PROCEDURE — 85378 FIBRIN DEGRADE SEMIQUANT: CPT | Performed by: NURSE PRACTITIONER

## 2022-06-28 PROCEDURE — 80047 BASIC METABLC PNL IONIZED CA: CPT

## 2022-06-28 PROCEDURE — 93005 ELECTROCARDIOGRAM TRACING: CPT

## 2022-06-28 PROCEDURE — 36415 COLL VENOUS BLD VENIPUNCTURE: CPT

## 2022-06-28 PROCEDURE — 99215 OFFICE O/P EST HI 40 MIN: CPT

## 2022-06-28 PROCEDURE — 71046 X-RAY EXAM CHEST 2 VIEWS: CPT | Performed by: NURSE PRACTITIONER

## 2022-06-28 PROCEDURE — 99213 OFFICE O/P EST LOW 20 MIN: CPT

## 2022-06-28 PROCEDURE — 93010 ELECTROCARDIOGRAM REPORT: CPT

## 2022-10-31 ENCOUNTER — TELEPHONE (OUTPATIENT)
Dept: FAMILY MEDICINE CLINIC | Facility: CLINIC | Age: 37
End: 2022-10-31

## 2022-10-31 NOTE — TELEPHONE ENCOUNTER
Returned call to patient who states he is not sick, feels fine. Has questions regarding lab values. Questions answered. Reviewed lab result note per Dr. Timbo Sneed and stressed healthy diet and exercise.

## 2023-02-10 ENCOUNTER — HOSPITAL ENCOUNTER (OUTPATIENT)
Age: 38
Discharge: HOME | End: 2023-02-10
Payer: COMMERCIAL

## 2023-02-10 VITALS
BODY MASS INDEX: 21.47 KG/M2 | TEMPERATURE: 99 F | DIASTOLIC BLOOD PRESSURE: 53 MMHG | WEIGHT: 150 LBS | OXYGEN SATURATION: 100 % | SYSTOLIC BLOOD PRESSURE: 108 MMHG | HEART RATE: 80 BPM | RESPIRATION RATE: 18 BRPM | HEIGHT: 70 IN

## 2023-02-10 DIAGNOSIS — Z51.89 VISIT FOR WOUND CHECK: Primary | ICD-10-CM

## 2023-02-10 PROCEDURE — 99212 OFFICE O/P EST SF 10 MIN: CPT

## 2023-05-04 ENCOUNTER — OFFICE VISIT (OUTPATIENT)
Dept: FAMILY MEDICINE CLINIC | Facility: CLINIC | Age: 38
End: 2023-05-04
Payer: COMMERCIAL

## 2023-05-04 ENCOUNTER — HOSPITAL ENCOUNTER (OUTPATIENT)
Dept: GENERAL RADIOLOGY | Age: 38
Discharge: HOME | End: 2023-05-04
Attending: FAMILY MEDICINE
Payer: COMMERCIAL

## 2023-05-04 VITALS
WEIGHT: 145 LBS | HEIGHT: 70 IN | RESPIRATION RATE: 16 BRPM | HEART RATE: 76 BPM | DIASTOLIC BLOOD PRESSURE: 70 MMHG | OXYGEN SATURATION: 99 % | BODY MASS INDEX: 20.76 KG/M2 | TEMPERATURE: 98 F | SYSTOLIC BLOOD PRESSURE: 100 MMHG

## 2023-05-04 DIAGNOSIS — Z00.00 LABORATORY EXAM ORDERED AS PART OF ROUTINE GENERAL MEDICAL EXAMINATION: ICD-10-CM

## 2023-05-04 DIAGNOSIS — M79.644 CHRONIC PAIN OF RIGHT THUMB: Primary | ICD-10-CM

## 2023-05-04 DIAGNOSIS — M79.644 CHRONIC PAIN OF RIGHT THUMB: ICD-10-CM

## 2023-05-04 DIAGNOSIS — G89.29 CHRONIC BILATERAL LOW BACK PAIN WITH RIGHT-SIDED SCIATICA: ICD-10-CM

## 2023-05-04 DIAGNOSIS — G89.29 CHRONIC PAIN OF RIGHT THUMB: ICD-10-CM

## 2023-05-04 DIAGNOSIS — M54.41 CHRONIC BILATERAL LOW BACK PAIN WITH RIGHT-SIDED SCIATICA: ICD-10-CM

## 2023-05-04 DIAGNOSIS — G89.29 CHRONIC PAIN OF RIGHT THUMB: Primary | ICD-10-CM

## 2023-05-04 PROCEDURE — 3008F BODY MASS INDEX DOCD: CPT | Performed by: FAMILY MEDICINE

## 2023-05-04 PROCEDURE — 3078F DIAST BP <80 MM HG: CPT | Performed by: FAMILY MEDICINE

## 2023-05-04 PROCEDURE — 99213 OFFICE O/P EST LOW 20 MIN: CPT | Performed by: FAMILY MEDICINE

## 2023-05-04 PROCEDURE — 73140 X-RAY EXAM OF FINGER(S): CPT | Performed by: FAMILY MEDICINE

## 2023-05-04 PROCEDURE — 3074F SYST BP LT 130 MM HG: CPT | Performed by: FAMILY MEDICINE

## 2023-06-08 ENCOUNTER — LAB ENCOUNTER (OUTPATIENT)
Dept: LAB | Age: 38
End: 2023-06-08
Attending: FAMILY MEDICINE
Payer: COMMERCIAL

## 2023-06-08 DIAGNOSIS — Z00.00 LABORATORY EXAM ORDERED AS PART OF ROUTINE GENERAL MEDICAL EXAMINATION: ICD-10-CM

## 2023-06-08 LAB
ALBUMIN SERPL-MCNC: 4.2 G/DL (ref 3.4–5)
ALBUMIN/GLOB SERPL: 1.1 {RATIO} (ref 1–2)
ALP LIVER SERPL-CCNC: 71 U/L
ALT SERPL-CCNC: 30 U/L
ANION GAP SERPL CALC-SCNC: 2 MMOL/L (ref 0–18)
AST SERPL-CCNC: 20 U/L (ref 15–37)
BASOPHILS # BLD AUTO: 0.04 X10(3) UL (ref 0–0.2)
BASOPHILS NFR BLD AUTO: 0.5 %
BILIRUB SERPL-MCNC: 0.4 MG/DL (ref 0.1–2)
BUN BLD-MCNC: 18 MG/DL (ref 7–18)
CALCIUM BLD-MCNC: 9.3 MG/DL (ref 8.5–10.1)
CHLORIDE SERPL-SCNC: 106 MMOL/L (ref 98–112)
CHOLEST SERPL-MCNC: 205 MG/DL (ref ?–200)
CO2 SERPL-SCNC: 29 MMOL/L (ref 21–32)
CREAT BLD-MCNC: 1.05 MG/DL
EOSINOPHIL # BLD AUTO: 0.32 X10(3) UL (ref 0–0.7)
EOSINOPHIL NFR BLD AUTO: 4.2 %
ERYTHROCYTE [DISTWIDTH] IN BLOOD BY AUTOMATED COUNT: 14.6 %
FASTING PATIENT LIPID ANSWER: YES
FASTING STATUS PATIENT QL REPORTED: YES
GFR SERPLBLD BASED ON 1.73 SQ M-ARVRAT: 93 ML/MIN/1.73M2 (ref 60–?)
GLOBULIN PLAS-MCNC: 3.8 G/DL (ref 2.8–4.4)
GLUCOSE BLD-MCNC: 88 MG/DL (ref 70–99)
HCT VFR BLD AUTO: 47.9 %
HDLC SERPL-MCNC: 42 MG/DL (ref 40–59)
HGB BLD-MCNC: 14.5 G/DL
IMM GRANULOCYTES # BLD AUTO: 0.02 X10(3) UL (ref 0–1)
IMM GRANULOCYTES NFR BLD: 0.3 %
LDLC SERPL CALC-MCNC: 141 MG/DL (ref ?–100)
LYMPHOCYTES # BLD AUTO: 2.74 X10(3) UL (ref 1–4)
LYMPHOCYTES NFR BLD AUTO: 35.5 %
MCH RBC QN AUTO: 25.1 PG (ref 26–34)
MCHC RBC AUTO-ENTMCNC: 30.3 G/DL (ref 31–37)
MCV RBC AUTO: 82.9 FL
MONOCYTES # BLD AUTO: 0.47 X10(3) UL (ref 0.1–1)
MONOCYTES NFR BLD AUTO: 6.1 %
NEUTROPHILS # BLD AUTO: 4.12 X10 (3) UL (ref 1.5–7.7)
NEUTROPHILS # BLD AUTO: 4.12 X10(3) UL (ref 1.5–7.7)
NEUTROPHILS NFR BLD AUTO: 53.4 %
NONHDLC SERPL-MCNC: 163 MG/DL (ref ?–130)
OSMOLALITY SERPL CALC.SUM OF ELEC: 285 MOSM/KG (ref 275–295)
PLATELET # BLD AUTO: 175 10(3)UL (ref 150–450)
POTASSIUM SERPL-SCNC: 4.7 MMOL/L (ref 3.5–5.1)
PROT SERPL-MCNC: 8 G/DL (ref 6.4–8.2)
RBC # BLD AUTO: 5.78 X10(6)UL
SODIUM SERPL-SCNC: 137 MMOL/L (ref 136–145)
TRIGL SERPL-MCNC: 124 MG/DL (ref 30–149)
TSI SER-ACNC: 1.97 MIU/ML (ref 0.36–3.74)
VLDLC SERPL CALC-MCNC: 23 MG/DL (ref 0–30)
WBC # BLD AUTO: 7.7 X10(3) UL (ref 4–11)

## 2023-06-08 PROCEDURE — 84443 ASSAY THYROID STIM HORMONE: CPT

## 2023-06-08 PROCEDURE — 80061 LIPID PANEL: CPT

## 2023-06-08 PROCEDURE — 36415 COLL VENOUS BLD VENIPUNCTURE: CPT

## 2023-06-08 PROCEDURE — 80053 COMPREHEN METABOLIC PANEL: CPT

## 2023-06-08 PROCEDURE — 85025 COMPLETE CBC W/AUTO DIFF WBC: CPT

## 2023-06-16 ENCOUNTER — APPOINTMENT (OUTPATIENT)
Dept: GENERAL RADIOLOGY | Age: 38
End: 2023-06-16
Attending: STUDENT IN AN ORGANIZED HEALTH CARE EDUCATION/TRAINING PROGRAM
Payer: COMMERCIAL

## 2023-06-16 ENCOUNTER — HOSPITAL ENCOUNTER (OUTPATIENT)
Age: 38
Discharge: HOME | End: 2023-06-16
Attending: STUDENT IN AN ORGANIZED HEALTH CARE EDUCATION/TRAINING PROGRAM
Payer: COMMERCIAL

## 2023-06-16 VITALS
SYSTOLIC BLOOD PRESSURE: 125 MMHG | TEMPERATURE: 98 F | DIASTOLIC BLOOD PRESSURE: 67 MMHG | OXYGEN SATURATION: 99 % | RESPIRATION RATE: 16 BRPM | HEART RATE: 73 BPM

## 2023-06-16 DIAGNOSIS — M47.816 FACET ARTHRITIS OF LUMBAR REGION: Primary | ICD-10-CM

## 2023-06-16 DIAGNOSIS — S33.5XXA SPRAIN OF LOW BACK, INITIAL ENCOUNTER: ICD-10-CM

## 2023-06-16 PROCEDURE — 72100 X-RAY EXAM L-S SPINE 2/3 VWS: CPT | Performed by: STUDENT IN AN ORGANIZED HEALTH CARE EDUCATION/TRAINING PROGRAM

## 2023-06-16 PROCEDURE — 99214 OFFICE O/P EST MOD 30 MIN: CPT

## 2023-06-16 PROCEDURE — 96372 THER/PROPH/DIAG INJ SC/IM: CPT

## 2023-06-16 RX ORDER — HYDROCODONE BITARTRATE AND ACETAMINOPHEN 5; 325 MG/1; MG/1
1 TABLET ORAL ONCE
Status: COMPLETED | OUTPATIENT
Start: 2023-06-16 | End: 2023-06-16

## 2023-06-16 RX ORDER — CYCLOBENZAPRINE HCL 10 MG
10 TABLET ORAL 3 TIMES DAILY PRN
Qty: 20 TABLET | Refills: 0 | Status: SHIPPED | OUTPATIENT
Start: 2023-06-16 | End: 2023-06-23

## 2023-06-16 RX ORDER — OXYCODONE HYDROCHLORIDE AND ACETAMINOPHEN 5; 325 MG/1; MG/1
1 TABLET ORAL EVERY 6 HOURS PRN
Qty: 10 TABLET | Refills: 0 | Status: SHIPPED | OUTPATIENT
Start: 2023-06-16 | End: 2023-06-21

## 2023-06-16 RX ORDER — KETOROLAC TROMETHAMINE 30 MG/ML
30 INJECTION, SOLUTION INTRAMUSCULAR; INTRAVENOUS ONCE
Status: COMPLETED | OUTPATIENT
Start: 2023-06-16 | End: 2023-06-16

## 2023-06-16 NOTE — ED INITIAL ASSESSMENT (HPI)
Patient c/o back pain primarily right sided  Does not radiate without urinary symptoms. Pain with movement had difficulty getting out of car.  Does not recall specific movement that triggered pain had done a 10mile bike ride but that is not unusual.

## 2023-06-16 NOTE — DISCHARGE INSTRUCTIONS
You can do home exercises for low back to help stretch out the lumbar spine. In addition you can do gluteal lift exercises as well as exercises to help strengthen your transverse abdominis.

## 2023-06-19 ENCOUNTER — OFFICE VISIT (OUTPATIENT)
Dept: PHYSICAL THERAPY | Age: 38
End: 2023-06-19
Attending: FAMILY MEDICINE
Payer: COMMERCIAL

## 2023-06-19 ENCOUNTER — TELEPHONE (OUTPATIENT)
Dept: PHYSICAL THERAPY | Facility: HOSPITAL | Age: 38
End: 2023-06-19

## 2023-06-19 DIAGNOSIS — M54.50 ACUTE EXACERBATION OF CHRONIC LOW BACK PAIN: Primary | ICD-10-CM

## 2023-06-19 DIAGNOSIS — G89.29 ACUTE EXACERBATION OF CHRONIC LOW BACK PAIN: Primary | ICD-10-CM

## 2023-06-19 DIAGNOSIS — M54.16 LUMBAR RADICULOPATHY: ICD-10-CM

## 2023-06-19 PROCEDURE — 97161 PT EVAL LOW COMPLEX 20 MIN: CPT

## 2023-06-19 PROCEDURE — 97110 THERAPEUTIC EXERCISES: CPT

## 2023-06-20 ENCOUNTER — OFFICE VISIT (OUTPATIENT)
Dept: PHYSICAL THERAPY | Age: 38
End: 2023-06-20
Attending: FAMILY MEDICINE
Payer: COMMERCIAL

## 2023-06-20 DIAGNOSIS — M54.50 ACUTE EXACERBATION OF CHRONIC LOW BACK PAIN: Primary | ICD-10-CM

## 2023-06-20 DIAGNOSIS — M54.16 LUMBAR RADICULOPATHY: ICD-10-CM

## 2023-06-20 DIAGNOSIS — G89.29 ACUTE EXACERBATION OF CHRONIC LOW BACK PAIN: Primary | ICD-10-CM

## 2023-06-20 PROCEDURE — 97110 THERAPEUTIC EXERCISES: CPT

## 2023-06-23 ENCOUNTER — TELEPHONE (OUTPATIENT)
Dept: PHYSICAL THERAPY | Facility: HOSPITAL | Age: 38
End: 2023-06-23

## 2023-06-28 ENCOUNTER — TELEPHONE (OUTPATIENT)
Dept: PHYSICAL THERAPY | Facility: HOSPITAL | Age: 38
End: 2023-06-28

## 2023-06-30 ENCOUNTER — APPOINTMENT (OUTPATIENT)
Dept: PHYSICAL THERAPY | Age: 38
End: 2023-06-30
Attending: FAMILY MEDICINE
Payer: COMMERCIAL

## 2023-07-05 ENCOUNTER — OFFICE VISIT (OUTPATIENT)
Dept: PHYSICAL THERAPY | Facility: HOSPITAL | Age: 38
End: 2023-07-05
Attending: FAMILY MEDICINE
Payer: COMMERCIAL

## 2023-07-05 PROCEDURE — 97110 THERAPEUTIC EXERCISES: CPT

## 2023-07-05 PROCEDURE — 97112 NEUROMUSCULAR REEDUCATION: CPT

## 2023-07-05 NOTE — PROGRESS NOTES
PT DAILY NOTE  Diagnosis:   Acute exacerbation of chronic LBP (M54.5,G89.29) , lumbar radiculopathy (M54.16)    Referring Provider: Sarai Young MD  Date of Evaluation:    6/19/2023    Precautions:  None Next MD visit:   none scheduled  Date of Surgery: n/a  Symptom onset: 6-15-23     Insurance Primary/Secondary: BCBS II O     Visit 2 of 8   Date POC Expires: 8-23-23       Subjective: Back is much better. Not having pain going into front of thigh, has not had since first few days. Reports gets this pain every 5-6 months, about 10 years. Past few times was back of thigh, this time was front. Usually bike 10-15 miles, almost every day. Reports day prior to pain, heard crack in inner thigh when getting off bike. Has biked 1x 10 miles last week and went okay. Pain: denies at present, describes as tightness  @eval: Kashmir Clifford is a 43year old male who presents to therapy today with complaints of LBP and Rt sciatica for 10 yrs, comes and goes. Latest flareup started 4 days ago; pain in Rt low back and into Rt anterior thigh. He typically bikes 10-15 miles every day. 4 days ago he did this no problem, but when walking later in the day, felt some pain in his Rt back and inner thigh. Then the next day (3 days ago) he had a hard time getting out of the car 2o pain, went to immediate care, gave him Norco and Flexeril and had injection in deltoid. He stopped taking both med 2o not yelping. However, he states his pain overall is improving since it onset. He feels he cannot stand up straight, but this improves if he puts both hands on low back. He had PT for this 10 yrs ago and it helped. Pain does not awaken him overnight. Pain c sitting and c walking. Has stopped bike riding right now 2o this pain. Pain c flexion ADLs. Denies any N/T. 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    Current functional limitations include difficulty standing upright, decr walking quality/tolerance, UA to ride bicycle, difficulty c flexion-based ADLs, pain c sitting. Paddy Prom describes prior level of function full and painfree. Pt goals include resolve pain, incr walking tolerance c upright posture and s pain, painfree ADLs. Past medical history was reviewed with Paddy Prom. Significant findings include migraine, anorexia. Pt denies diplopia, dysarthria, dysphasia, dizziness, drop attacks, bowel/bladder changes, saddle anesthesia, and RAJAT LE N/T. Objective:    (7/5/2023)  Lumbar ROM:   Flexion: 60 (tightness center back)  Extension: hinges L5/S1   Side flexion: WNL and painfree  Rotation: WNL     Psoas palpation: significant tenderness B, does not tolerate well     SLR: 65 B     Flexibility:   *max restrictions B psoas  *90/90 HS: 32 away from 0 B     Hip screen ROM: WNL IR/ER/flexion  *scour -         Assessment: Patient overall less irritability since evaluation. No longer with leg pain, some intermittent back stiffness. Re-assessment as this is my first session with patient, seen by PT at another clinic and transfer d/t location. Notable APT in standing and increased lordosis. Some pain/tightness end range flexion and closing facet posterior quadrants. Significant psoas restrictions, going into extension with passive hip extension prior to neutral. Significant tenderness with attempts palpation-held. Added psoas stretch with neutral pelvic tilt, abd brace 90/90, and cat to neutral to help improve neutral spine positioning. Plan: progress neutral spine core strength; re-assess psoas palpation  Date: 6/19/23  TX#: 2/ Date:             7/5/2023    TX#: 3/ Date:                 TX#: 4/ Date:                 TX#: 5/ Date: Tx#: 6/   Therapeutic Exercise:    Added treadmill walk at self-selected speed (2.0mph) for incr walking tolerance 5min  WAYNE 5\"x15 (increased)  SKTC 20\"x3 ea Rt, Lf   LTR 2min  Added bridges 5\"x10  Added hooklying core stab c minimarch 1minx2  Added S/L clamshell 5\"x10 ea Rt, Lf   Added 3way child's pose stretch 10\"x2 ea direction Therapeutic Exercise:   Re-assessment  Hip flexor psoas kneeling stretch 2 x 30s ea         Neuro Re-Ed:  Bridges working on neutral spine 15x  90/90 abd brace taps alt x 5 ea   Quadruped hip ext 8 ea   Discussed standing posture, trials more neutral tilt          Manual Therapy  SL opening technique R target L4/5 G3                  HEP:   Hip flexor psoas stretch 3 x 30s ea, 2x daily  90/90 brace taps 2 sets  Cat to neutral 10x 3s, 2x daily    PLAN OF CARE:    Goals: (to be met in 8 visits)   1. Centralize pain to low back to indicate decr irritability and severity of symtpoms  2. Consistently decr pain < or = 2/10 intermittent for incr QOL and activity tolerance  3. Overall incr in function as indicated by decr Oswestry <10%  4. painfree full AROM lumbar spine to allow painfree ADLs and upright walking posture  5. Pt able to ambulate at least 1 mile s gait deficits c little to no pain for PLOF  6. Pt able to sit at least 30 min bouts c proper support and posture s pain for PLOF  7. incr RLE MMT at least 1/2 grade painfree throughout for incr mm support for WB activities  8. indep HEP to promote cont progress toward functional goals    Frequency / Duration: Patient will be seen for 1-2 x/week or a total of up to 8 visits over a 90 day period.        Charges: TE x 1(15'); NR x 2 (23)  ; manual x 0 2'      Total Timed Treatment: 40 min  Total Treatment Time: 40 min

## 2023-07-06 NOTE — PROGRESS NOTES
PT DAILY NOTE  Diagnosis:   Acute exacerbation of chronic LBP (M54.5,G89.29) , lumbar radiculopathy (M54.16)    Referring Provider: Peace Linda MD  Date of Evaluation:    6/19/2023    Precautions:  None Next MD visit:   none scheduled  Date of Surgery: n/a  Symptom onset: 6-15-23     Insurance Primary/Secondary: BCBS II O     Visit 4 of 8   Date POC Expires: 8-23-23       Subjective: Exercises went okay. Pain: denies at present, describes as tightness    Objective:    (7/5/2023)  Lumbar ROM:   Flexion: 60 (tightness center back)  Extension: hinges L5/S1   Side flexion: WNL and painfree  Rotation: WNL     Psoas palpation: significant tenderness B, does not tolerate well     SLR: 65 B     Flexibility:   *max restrictions B psoas  *90/90 HS: 32 away from 0 B     Hip screen ROM: WNL IR/ER/flexion  *scour -         Assessment: Continued work on neutral spine core strength, fatigues with 90/90 taps still. Able to progress into bird dog after addition of mirror for feedback for neutral spine. Added supine RF stretch as well for anterior tightness. Some difficulty with coordination with lateral band walks but improves with cuing. Still does not tolerate psoas palpation, will continue with stretching. Plan: progress neutral spine core strength; flexion biased mobility; upright bike   Date: 6/19/23  TX#: 2/ Date:             7/5/2023    TX#: 3/ Date:         7/7/23        TX#: 4/ Date:                 TX#: 5/ Date: Tx#: 6/   Therapeutic Exercise:    Added treadmill walk at self-selected speed (2.0mph) for incr walking tolerance 5min  WAYNE 5\"x15 (increased)  SKTC 20\"x3 ea Rt, Lf   LTR 2min  Added bridges 5\"x10  Added hooklying core stab c minimarch 1minx2  Added S/L clamshell 5\"x10 ea Rt, Lf   Added 3way child's pose stretch 10\"x2 ea direction Therapeutic Exercise:   Re-assessment  Hip flexor psoas kneeling stretch 2 x 30s ea   Therapeutic Exercise:   Elliptical L3 x 5', subj hx  Kneeling psoas stretch 2 x 30s   Table RF stretch by PT x 2' ea   Open books 5s x 8 ea       Neuro Re-Ed:  Bridges working on neutral spine 15x  90/90 abd brace taps alt x 5 ea   Quadruped hip ext 8 ea   Discussed standing posture, trials more neutral tilt    Neuro Re-Ed:  Bridge SB short lever focus on neutral spine 2 x 15  90/90 brace taps: 3 sets to tired   Quadruped hip extension 8 ea with mirror feedback  Bird dog 8 ea, mirror feedback   Anti-rotation 15# 15x ea   Lateral band walk with pelvic control RTB x 1 lap      Manual Therapy  SL opening technique R target L4/5 G3                  HEP:   Hip flexor psoas stretch 3 x 30s ea, 2x daily  90/90 brace taps 2 sets  Cat to neutral 10x 3s, 2x daily    PLAN OF CARE:    Goals: (to be met in 8 visits)   1. Centralize pain to low back to indicate decr irritability and severity of symtpoms (MET)  2. Consistently decr pain < or = 2/10 intermittent for incr QOL and activity tolerance  3. Overall incr in function as indicated by decr Oswestry <10%  4. painfree full AROM lumbar spine to allow painfree ADLs and upright walking posture  5. Pt able to ambulate at least 1 mile s gait deficits c little to no pain for PLOF  6. Pt able to sit at least 30 min bouts c proper support and posture s pain for PLOF  7. incr RLE MMT at least 1/2 grade painfree throughout for incr mm support for WB activities  8. indep HEP to promote cont progress toward functional goals    Frequency / Duration: Patient will be seen for 1-2 x/week or a total of up to 8 visits over a 90 day period.        Charges: TE x 1(12'); NR x 2 (33)    Total Timed Treatment: 45 min  Total Treatment Time: 45 min

## 2023-07-07 ENCOUNTER — OFFICE VISIT (OUTPATIENT)
Dept: PHYSICAL THERAPY | Facility: HOSPITAL | Age: 38
End: 2023-07-07
Attending: FAMILY MEDICINE
Payer: COMMERCIAL

## 2023-07-07 ENCOUNTER — APPOINTMENT (OUTPATIENT)
Dept: PHYSICAL THERAPY | Age: 38
End: 2023-07-07
Attending: FAMILY MEDICINE
Payer: COMMERCIAL

## 2023-07-07 PROCEDURE — 97110 THERAPEUTIC EXERCISES: CPT

## 2023-07-07 PROCEDURE — 97112 NEUROMUSCULAR REEDUCATION: CPT

## 2023-07-11 NOTE — PROGRESS NOTES
PT DAILY NOTE  Diagnosis:   Acute exacerbation of chronic LBP (M54.5,G89.29) , lumbar radiculopathy (M54.16)    Referring Provider: Michael Holt MD  Date of Evaluation:    6/19/2023    Precautions:  None Next MD visit:   none scheduled  Date of Surgery: n/a  Symptom onset: 6-15-23     Insurance Primary/Secondary: BCBS II HMO      Date POC Expires: 8-23-23     12' late to session  Subjective: Didn't do exercises past two days because worked 2 12s. When in standing tries to do more PPT. Reports when getting out of car, heard that crack, was not painful, but was worried would increase pain but did not. Denies any back pain. Pain: denies at present, describes as tightness    Objective:    (7/5/2023)  Lumbar ROM:   Flexion: 60 (tightness center back)  Extension: hinges L5/S1   Side flexion: WNL and painfree  Rotation: WNL     Psoas palpation: significant tenderness B, does not tolerate well     SLR: 65 B     Flexibility:   *max restrictions B psoas  *90/90 HS: 32 away from 0 B     Hip screen ROM: WNL IR/ER/flexion  *scour -       Assessment: Corrective cues for kneeling hip flexor stretch to prevent lumbar extension and better isolate psoas mm. Recommended even on busy days trying to get in psoas stretch as flexibility changes takes diligence, trial in standing for psoas stretch as well as alternative so can perform even throughout work as able. Continued work on strengthening in neutral spine. Work on squat form with prevention of lordosis. Cues for prevention knees over toes while maintaining. Plan: progress neutral spine core strength; flexion biased mobility; upright bike   Date: 6/19/23  TX#: 2/ Date:             7/5/2023    TX#: 3/ Date:         7/7/23        TX#: 4/ Date:   7/12/2023            TX#: 5/ Date: Tx#: 6/   Therapeutic Exercise:    Added treadmill walk at self-selected speed (2.0mph) for incr walking tolerance 5min  WAYNE 5\"x15 (increased)  SKTC 20\"x3 ea Rt, Lf   LTR 2min  Added bridges 5\"x10  Added hooklying core stab c minimarch 1minx2  Added S/L clamshell 5\"x10 ea Rt, Lf   Added 3way child's pose stretch 10\"x2 ea direction Therapeutic Exercise:   Re-assessment  Hip flexor psoas kneeling stretch 2 x 30s ea   Therapeutic Exercise:   Elliptical L3 x 5', subj hx  Kneeling psoas stretch 2 x 30s   Table RF stretch by PT x 2' ea   Open books 5s x 8 ea  Therapeutic Exercise:   Subj hx  Kneeling psoas stretch 2 x 30s  (cues prevention lumbar extension)  Trial standing psoas stretch 30s ea      Neuro Re-Ed:  Bridges working on neutral spine 15x  90/90 abd brace taps alt x 5 ea   Quadruped hip ext 8 ea   Discussed standing posture, trials more neutral tilt    Neuro Re-Ed:  Bridge SB short lever focus on neutral spine 2 x 15  90/90 brace taps: 3 sets to tired   Quadruped hip extension 8 ea with mirror feedback  Bird dog 8 ea, mirror feedback   Anti-rotation 15# 15x ea   Lateral band walk with pelvic control RTB x 1 lap Neuro Re-Ed:  UE bias for core:   *90/90 taps 10 ea  *small range dead bug x 8 ea , 2 sets     Squat with 4# ball 2 x 12   Anti-rotation 15# 15x ea   Multifidus walks 5 ea, 15x         Manual Therapy  SL opening technique R target L4/5 G3                  HEP:   Hip flexor psoas stretch 3 x 30s ea, 2x daily  90/90 brace taps 2 sets  Cat to neutral 10x 3s, 2x daily    PLAN OF CARE:    Goals: (to be met in 8 visits)   1. Centralize pain to low back to indicate decr irritability and severity of symtpoms (MET)  2. Consistently decr pain < or = 2/10 intermittent for incr QOL and activity tolerance (MET)  3. Overall incr in function as indicated by decr Oswestry <10%  4. painfree full AROM lumbar spine to allow painfree ADLs and upright walking posture (MET)  5. Pt able to ambulate at least 1 mile s gait deficits c little to no pain for PLOF  6.  Pt able to sit at least 30 min bouts c proper support and posture s pain for PLOF(MET)  7. incr RLE MMT at least 1/2 grade painfree throughout for incr mm support for WB activities  8. indep HEP to promote cont progress toward functional goals    Frequency / Duration: Patient will be seen for 1-2 x/week or a total of up to 8 visits over a 90 day period.        Charges: TE x 1(8'); NR x 1(19)    Total Timed Treatment: 27 min  Total Treatment Time: 28 min

## 2023-07-12 ENCOUNTER — OFFICE VISIT (OUTPATIENT)
Dept: PHYSICAL THERAPY | Facility: HOSPITAL | Age: 38
End: 2023-07-12
Attending: FAMILY MEDICINE
Payer: COMMERCIAL

## 2023-07-12 PROCEDURE — 97112 NEUROMUSCULAR REEDUCATION: CPT

## 2023-07-12 PROCEDURE — 97110 THERAPEUTIC EXERCISES: CPT

## 2023-07-18 ENCOUNTER — OFFICE VISIT (OUTPATIENT)
Dept: PHYSICAL THERAPY | Facility: HOSPITAL | Age: 38
End: 2023-07-18
Attending: FAMILY MEDICINE
Payer: COMMERCIAL

## 2023-07-18 PROCEDURE — 97110 THERAPEUTIC EXERCISES: CPT

## 2023-07-18 PROCEDURE — 97140 MANUAL THERAPY 1/> REGIONS: CPT

## 2023-07-18 NOTE — PROGRESS NOTES
PT DAILY NOTE  Diagnosis:   Acute exacerbation of chronic LBP (M54.5,G89.29) , lumbar radiculopathy (M54.16)    Referring Provider: Sarai Young MD  Date of Evaluation:    6/19/2023    Precautions:  None Next MD visit:   none scheduled  Date of Surgery: n/a  Symptom onset: 6-15-23     Insurance Primary/Secondary: BCBS II HMO      Date POC Expires: 8-23-23     Subjective: Reports was gone Friday to Sunday. Reports was on plane. Once got off had back pain in the afternoon. Yesterday was working, wore back bent and that helped. Pain is on R low back. Could not try stretches because was working. Pain: currently 2-3/10, yesterday 6-7/10  Describes as aching pain, denies radiation or n/t     Objective:   Accessory motion: mild reduced P-A L3/4/5 R UL; improves with mobilization    7/18/2023  Lumbar ROM:   Flexion: 65 (leg stretches)  Extension: hinges L5/S1   Side flexion: WNL and painfree  Rotation: WNL   Quadrants: all - for increased pain    Hip screen ROM: WNL IR/ER/flexion  *scour -       Flexibility: (7/5/23)  *max restrictions B psoas  *90/90 HS: 32 away from 0 B         Assessment:  Mild reduced mobility P-A prone over pillows R UL P-A L3-L5. Following P-A improved back comfort but feels something mild posterior thigh. SL opening for L4/5 and then resolved thigh and continued improved back. Following manual and opening techniques reports <1/10 symptoms. Use of opening techniques and then continued light neutral spine core with good tolerance. End of session requests P-A in prone again, feels this was most helpful and relieving, performed in prone over pillow. Performed briefly as to avoid thigh symptoms. Non present end of session. Added maggie pose to HEP. Instructed in continuation HEP.      Plan: progress neutral spine core strength; flexion biased mobility; re-check hip strength ; continue 1x/week  Date: 6/19/23  TX#: 2/ Date:             7/5/2023    TX#: 3/ Date:         7/7/23        TX#: 4/ Date:   7/12/2023            TX#: 5/ Date: 7/18/2023  Tx#: 6/   Therapeutic Exercise: Added treadmill walk at self-selected speed (2.0mph) for incr walking tolerance 5min  WAYNE 5\"x15 (increased)  SKTC 20\"x3 ea Rt, Lf   LTR 2min  Added bridges 5\"x10  Added hooklying core stab c minimarch 1minx2  Added S/L clamshell 5\"x10 ea Rt, Lf   Added 3way child's pose stretch 10\"x2 ea direction Therapeutic Exercise:   Re-assessment  Hip flexor psoas kneeling stretch 2 x 30s ea   Therapeutic Exercise:   Elliptical L3 x 5', subj hx  Kneeling psoas stretch 2 x 30s   Table RF stretch by PT x 2' ea   Open books 5s x 8 ea  Therapeutic Exercise:   Subj hx  Kneeling psoas stretch 2 x 30s  (cues prevention lumbar extension)  Trial standing psoas stretch 30s ea  Therapeutic Exercise:   Subj hx and re-assessment   Opal pose central 3 x 20s   Opal pose R opening bias 3 x 20s  Open book R opening 5s x 8    Kneeling psoas stretch 3 x 30s     Neuro Re-Ed:  Bridges working on neutral spine 15x  90/90 abd brace taps alt x 5 ea   Quadruped hip ext 8 ea   Discussed standing posture, trials more neutral tilt    Neuro Re-Ed:  Bridge SB short lever focus on neutral spine 2 x 15  90/90 brace taps: 3 sets to tired   Quadruped hip extension 8 ea with mirror feedback  Bird dog 8 ea, mirror feedback   Anti-rotation 15# 15x ea   Lateral band walk with pelvic control RTB x 1 lap Neuro Re-Ed:  UE bias for core:   *90/90 taps 10 ea  *small range dead bug x 8 ea , 2 sets     Squat with 4# ball 2 x 12   Anti-rotation 15# 15x ea   Multifidus walks 5 ea, 15x     Neuro Re-Ed:  90/90 taps 3 x 8 ea alt           Manual Therapy  SL opening technique R target L4/5 G3    Manual Therapy  P-A G III L3, L4, L5  SL lumbar opening L4/5 G3 x 2'               HEP:   Hip flexor psoas stretch 3 x 30s ea, 2x daily  90/90 brace taps 2 sets  Cat to neutral 10x 3s, 2x daily  Opal pose     PLAN OF CARE:    Goals: (to be met in 8 visits)   1.  Centralize pain to low back to indicate decr irritability and severity of symtpoms (MET)  2. Consistently decr pain < or = 2/10 intermittent for incr QOL and activity tolerance (MET)  3. Overall incr in function as indicated by decr Oswestry <10%  4. painfree full AROM lumbar spine to allow painfree ADLs and upright walking posture (MET)  5. Pt able to ambulate at least 1 mile s gait deficits c little to no pain for PLOF  6. Pt able to sit at least 30 min bouts c proper support and posture s pain for PLOF(MET)  7. incr RLE MMT at least 1/2 grade painfree throughout for incr mm support for WB activities  8. indep HEP to promote cont progress toward functional goals    Frequency / Duration: Patient will be seen for 1-2 x/week or a total of up to 8 visits over a 90 day period.        Charges: Manual x 1 (10'); TE x 2 (25')   Total Timed Treatment: 39 min  Total Treatment Time: 39 min

## 2023-07-19 ENCOUNTER — APPOINTMENT (OUTPATIENT)
Dept: PHYSICAL THERAPY | Facility: HOSPITAL | Age: 38
End: 2023-07-19
Attending: FAMILY MEDICINE
Payer: COMMERCIAL

## 2023-07-22 ENCOUNTER — TELEMEDICINE (OUTPATIENT)
Dept: TELEHEALTH | Age: 38
End: 2023-07-22

## 2023-07-22 DIAGNOSIS — Z02.9 ADMINISTRATIVE ENCOUNTER: Primary | ICD-10-CM

## 2023-07-22 NOTE — PROGRESS NOTES
Pt submitted video visit. While reviewing pt's chart, appt was cancelled.     Cancelled via pt portal with reason: accidentally signed up  No visit charge

## 2023-07-24 ENCOUNTER — TELEPHONE (OUTPATIENT)
Dept: FAMILY MEDICINE CLINIC | Facility: CLINIC | Age: 38
End: 2023-07-24

## 2023-07-24 NOTE — TELEPHONE ENCOUNTER
Appointment For: Damari Mcneill ([id number])   Visit Type: [address] [place] [address] ([id number])      8/3/2023     9:20 AM  20 mins.   Ben Zavala MD     [facility] [address]      Patient Comments:   Pain

## 2023-07-24 NOTE — TELEPHONE ENCOUNTER
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 Please advise.      Future Appointments   Date Time Provider Gaby Toro   7/26/2023 11:00 AM Ubaldo Coleman, PT [facility] [address]   7/27/2023 12:00 PM Gaurav Aguilera MD [facility] [address]   8/1/2023  5:00 PM Ubaldo Coleman, PT [facility] [address]

## 2023-07-24 NOTE — TELEPHONE ENCOUNTER
Left message to call back. When patient returns call. Tell him, Dr. Roberto Babb can see him on 7/26 at 10:00, if he can. Double book.

## 2023-07-24 NOTE — TELEPHONE ENCOUNTER
Spoke to patient who states he has been having some scrotal pain. He spoke to a friend who is a Urologist who ordered an [address] of his testicles. He is asking if we can get authorization for the [address]. Requested he send a copy of order per his MyChart as we cannot see it in [address] [place] [address] Loop. Reviewed with Dr. Patircia Alvarez who states patient must be seen in office with his insurance to get [address] authorized. My Chart message sent to patient .

## 2023-07-24 NOTE — TELEPHONE ENCOUNTER
Attempted to call patient. No answer and no identifying information on voicemail. Unable to leave message. [employer] message sent requesting return call.

## 2023-07-24 NOTE — TELEPHONE ENCOUNTER
Spoke to pt who's requesting a referral to see a Urologist.  Pt is experiencing pain when urinating.

## 2023-07-24 NOTE — TELEPHONE ENCOUNTER
Pt just called back and asked to speak to Demetria Sosa again. Kamini Mata was on the phone when I tried transferring the call).

## 2023-07-26 ENCOUNTER — OFFICE VISIT (OUTPATIENT)
Dept: FAMILY MEDICINE CLINIC | Facility: CLINIC | Age: 38
End: 2023-07-26
Payer: COMMERCIAL

## 2023-07-26 ENCOUNTER — OFFICE VISIT (OUTPATIENT)
Dept: PHYSICAL THERAPY | Facility: HOSPITAL | Age: 38
End: 2023-07-26
Attending: FAMILY MEDICINE
Payer: COMMERCIAL

## 2023-07-26 VITALS
SYSTOLIC BLOOD PRESSURE: 102 MMHG | TEMPERATURE: 98 F | WEIGHT: 147 LBS | OXYGEN SATURATION: 98 % | HEART RATE: 74 BPM | HEIGHT: 70 IN | DIASTOLIC BLOOD PRESSURE: 80 MMHG | RESPIRATION RATE: 18 BRPM | BODY MASS INDEX: 21.05 KG/M2

## 2023-07-26 DIAGNOSIS — N50.89 TESTICULAR LUMP: Primary | ICD-10-CM

## 2023-07-26 PROCEDURE — 97112 NEUROMUSCULAR REEDUCATION: CPT

## 2023-07-26 PROCEDURE — 97140 MANUAL THERAPY 1/> REGIONS: CPT

## 2023-07-26 PROCEDURE — 3074F SYST BP LT 130 MM HG: CPT | Performed by: FAMILY MEDICINE

## 2023-07-26 PROCEDURE — 3079F DIAST BP 80-89 MM HG: CPT | Performed by: FAMILY MEDICINE

## 2023-07-26 PROCEDURE — 3008F BODY MASS INDEX DOCD: CPT | Performed by: FAMILY MEDICINE

## 2023-07-26 PROCEDURE — 97110 THERAPEUTIC EXERCISES: CPT

## 2023-07-26 PROCEDURE — 99213 OFFICE O/P EST LOW 20 MIN: CPT | Performed by: FAMILY MEDICINE

## 2023-07-26 NOTE — PROGRESS NOTES
PT DAILY NOTE  Diagnosis:   Acute exacerbation of chronic LBP (M54.5,G89.29) , lumbar radiculopathy (M54.16)    Referring Provider: Cathe Shone MD  Date of Evaluation:    6/19/2023    Precautions:  None Next MD visit:   none scheduled  Date of Surgery: n/a  Symptom onset: 6-15-23     Insurance Primary/Secondary: [employer] II HMO      Date POC Expires: 8-23-23     Subjective: Back has been wonderful, no issues since last visit. Been working hard on stretches and pelvic control. Pain: denies any back or thigh pain since last visit    Objective:   Lumbar ROM and quadrants: all WNL and painfree  (7/26/2023)    7/18/2023  Lumbar ROM:   Flexion: 65 (leg stretches)  Extension: hinges L5/S1   Side flexion: WNL and painfree  Rotation: WNL   Quadrants: all - for increased pain    Hip screen ROM: WNL IR/ER/flexion  *scour -       Flexibility: (7/5/23)  *max restrictions B psoas  *90/90 HS: 32 away from 0 B         Assessment:  Denies any pain since last week. Mild restrictions lower lumbar so continued manual for mobilization. Then continued work on neutral spine core strengthening. Beau Gibbs still adopts APT sometimes during ex but with cuing, able to achieve and maintain more neutral. Progressed strength portion of HEP and handout issued.      Plan: progress neutral spine core strength; flexion biased mobility; re-check hip strength ; continue 1x/week  Date:             7/5/2023    TX#: 3/ Date:         7/7/23        TX#: 4/ Date:   7/12/2023            TX#: 5/ Date: 7/18/2023  Tx#: 6/ 7/26/2023  7/    Therapeutic Exercise:   Re-assessment  Hip flexor psoas kneeling stretch 2 x 30s ea   Therapeutic Exercise:   Elliptical L3 x 5', subj hx  Kneeling psoas stretch 2 x 30s   Table RF stretch by PT x 2' ea   Open books 5s x 8 ea  Therapeutic Exercise:   Subj hx  Kneeling psoas stretch 2 x 30s  (cues prevention lumbar extension)  Trial standing psoas stretch 30s ea  Therapeutic Exercise:   Subj hx and re-assessment   Quyen Donahue pose central 3 x 20s   Opal pose R opening bias 3 x 20s  Open book R opening 5s x 8    Kneeling psoas stretch 3 x 30s  Therapeutic Exercise:   Open book R opening 5s x 8   Opal pose central 3 x 20s   Kneeling hip flexor stretch 30s x 2   3 x 30s kneeling psoas stretch    Neuro Re-Ed:  Bridges working on neutral spine 15x  90/90 abd brace taps alt x 5 ea   Quadruped hip ext 8 ea   Discussed standing posture, trials more neutral tilt    Neuro Re-Ed:  Bridge SB short lever focus on neutral spine 2 x 15  90/90 brace taps: 3 sets to tired   Quadruped hip extension 8 ea with mirror feedback  Bird dog 8 ea, mirror feedback   Anti-rotation 15# 15x ea   Lateral band walk with pelvic control RTB x 1 lap Neuro Re-Ed:  UE bias for core:   *90/90 taps 10 ea  *small range dead bug x 8 ea , 2 sets     Squat with 4# ball 2 x 12   Anti-rotation 15# 15x ea   Multifidus walks 5 ea, 15x     Neuro Re-Ed:  90/90 taps 3 x 8 ea alt        Neuro Re-Ed:  Dead bugs legs only 8 ea alt   Quadruped hip extension working on neutral spine 8 ea   Anti-rotation press GTB 2 x 12 , cues to prevent APT   LBW R looped with focus neutral spine    Manual Therapy  SL opening technique R target L4/5 G3    Manual Therapy  P-A G III L3, L4, L5  SL lumbar opening L4/5 G3 x 2'  Manual Therapy  P-A G III L3, L4, L5 R UL  SL lumbar opening L4/5 G3 x 2'               HEP:   Hip flexor psoas stretch 3 x 30s ea, 2x daily  Dead bug Les only; anti-rotation press (both with neutral spine maintenance)  Cat to neutral 10x 3s, 2x daily  Opal pose     PLAN OF CARE:    Goals: (to be met in 8 visits)   1. Centralize pain to low back to indicate decr irritability and severity of symtpoms (MET)  2. Consistently decr pain < or = 2/10 intermittent for incr QOL and activity tolerance (MET)  3. Overall incr in function as indicated by decr Oswestry <10%  4. painfree full AROM lumbar spine to allow painfree ADLs and upright walking posture (MET)  5.  Pt able to ambulate at least 1 mile s gait deficits c little to no pain for PLOF  6. Pt able to sit at least 30 min bouts c proper support and posture s pain for PLOF(MET)  7. incr RLE MMT at least 1/2 grade painfree throughout for incr mm support for WB activities  8. indep HEP to promote cont progress toward functional goals    Frequency / Duration: Patient will be seen for 1-2 x/week or a total of up to 8 visits over a 90 day period.        Charges: Manual x 1 (8'); TE x 1(12'); NR x 1 (19')   Total Timed Treatment: 39 min  Total Treatment Time: 39 min

## 2023-07-31 NOTE — PROGRESS NOTES
Progress Summary  Pt has attended 8 visits in Physical Therapy. Diagnosis:   Acute exacerbation of chronic LBP (M54.5,G89.29) , lumbar radiculopathy (M54.16)    Referring Provider: Ben Zavala MD  Date of Evaluation:    6/19/2023    Precautions:  None Next MD visit:   none scheduled  Date of Surgery: n/a  Symptom onset: 6-15-23     Insurance Primary/Secondary: [employer] II HMO      Date POC Expires: 8-23-23     Subjective: Denies pain since last visit. More awareness of position in standing. Doing HEP. Did 10 miles on the bike today and felt good, no issues. Inquiring about what if back flares up again. Pain: denies any back or thigh pain since last visit    Objective:   Still stands with APT, improved ability to correct with ex with less cuing    Lumbar ROM and quadrants: all WNL and painfree  (7/26/2023)    7/18/2023  Lumbar ROM:   Flexion: 65 (leg stretches)  Extension: hinges L5/S1   Side flexion: WNL and painfree  Rotation: WNL   Quadrants: all - for increased pain    Hip screen ROM: WNL IR/ER/flexion  *scour -       Flexibility: (7/5/23)  *max restrictions B psoas  *90/90 HS: 32 away from 0 B         Assessment: Steve Turner has completed 8 visits in PT for his back pain. Demonstrates full and pain-free ROM. One incidence of some pain during duration with this PT following weekend where was at a wedding and then prolonged flight home but resolved quickly with lumbar facet opening techniques and return to HEP. Has been able to return to biking x 10 miles without pain and denies pain in last 2 weeks. Still with resting posture and relative APT but demonstrates significantly better awareness of neutral spine and core strength. Still with difficulty finding neutral at times, use of mirror feedback today to work on lumbo-pelvic control. Discussed need for continued HEP performance but appropriate to stagger visits.  Discussed if doing well, can cancel 2 remaining scheduled and continue HEP, but POC for as many as 4 visits prn. Plan: Continue skilled Physical Therapy 1 x/2-3weeks or a total of 4 visits prn over a 90 day period. Treatment will include: therapeutic exercise including ROM, strengthening, stretching program; neuromuscular re-education for balance and proprioception, pelvic and core stabilization, patient education for posture, body mechanics, ergonomics; modalities as needed; manual therapy to include joint mobilization, distraction, and soft tissue mobilization as needed; HEP instruction and progression   Discussed if patient still doing well, can cancel current 2 scheduled visits. Patient/Family/Caregiver was advised of these findings, precautions, and treatment options and has agreed to actively participate in planning and for this course of care. Thank you for your referral. If you have any questions, please contact me at Dept: [phone]. Sincerely,  Electronically signed by therapist: Liliana Hernandez PT     Physician's certification required:  Yes  Please co-sign or sign and return this letter via fax as soon as possible to [phone]. I certify the need for these services furnished under this plan of treatment and while under my care.     X___________________________________________________ Date____________________    Certification From: 1/6/2375  To:10/30/2023     Date:             7/5/2023    TX#: 3/ Date:         7/7/23        TX#: 4/ Date:   7/12/2023            TX#: 5/ Date: 7/18/2023  Tx#: 6/ 7/26/2023 7/ 7/31/2023  8/    Therapeutic Exercise:   Re-assessment  Hip flexor psoas kneeling stretch 2 x 30s ea   Therapeutic Exercise:   Elliptical L3 x 5', subj hx  Kneeling psoas stretch 2 x 30s   Table RF stretch by PT x 2' ea   Open books 5s x 8 ea  Therapeutic Exercise:   Subj hx  Kneeling psoas stretch 2 x 30s  (cues prevention lumbar extension)  Trial standing psoas stretch 30s ea  Therapeutic Exercise:   Subj hx and re-assessment   Opal pose central 3 x 20s   Opal pose R opening bias 3 x 20s  Open book R opening 5s x 8    Kneeling psoas stretch 3 x 30s  Therapeutic Exercise:   Open book R opening 5s x 8   Opal pose central 3 x 20s   Kneeling hip flexor stretch 30s x 2   3 x 30s kneeling psoas stretch  Therapeutic Exercise:   Elliptical NAGY, L3 x 5', subj hx and POC discussion  Kneeling hip flexor stretch 30s x 3 ea   LTR for active rest   Opal pose 30s , multiple bouts    Neuro Re-Ed:  Bridges working on neutral spine 15x  90/90 abd brace taps alt x 5 ea   Quadruped hip ext 8 ea   Discussed standing posture, trials more neutral tilt    Neuro Re-Ed:  Bridge SB short lever focus on neutral spine 2 x 15  90/90 brace taps: 3 sets to tired   Quadruped hip extension 8 ea with mirror feedback  Bird dog 8 ea, mirror feedback   Anti-rotation 15# 15x ea   Lateral band walk with pelvic control RTB x 1 lap Neuro Re-Ed:  UE bias for core:   *90/90 taps 10 ea  *small range dead bug x 8 ea , 2 sets     Squat with 4# ball 2 x 12   Anti-rotation 15# 15x ea   Multifidus walks 5 ea, 15x     Neuro Re-Ed:  90/90 taps 3 x 8 ea alt        Neuro Re-Ed:  Dead bugs legs only 8 ea alt   Quadruped hip extension working on neutral spine 8 ea   Anti-rotation press GTB 2 x 12 , cues to prevent APT   LBW R looped with focus neutral spine  Neuro Re-Ed:  LBW R looped with focus neutral spine x 3 laps   Dead bugs legs only 10 ea alt , 3 sets   Quadruped hip extension working on neutral spine 8 ea with mirror feedback for neutral spine , 2 sets  Multifidus walks 2 x 7 15#   Anti-rotation press 15# 15x ea   Forward walk 15# B 10x ea   Retro walk 15# B 10x ea    Manual Therapy  SL opening technique R target L4/5 G3    Manual Therapy  P-A G III L3, L4, L5  SL lumbar opening L4/5 G3 x 2'  Manual Therapy  P-A G III L3, L4, L5 R UL  SL lumbar opening L4/5 G3 x 2'                 HEP:   Hip flexor psoas stretch 3 x 30s ea, 2x daily  Dead bug Les only; anti-rotation press (both with neutral spine maintenance)  Cat to neutral 10x 3s, 2x daily  Opal pose     PLAN OF CARE:    Goals: (to be met in 12 visits)   1. Centralize pain to low back to indicate decr irritability and severity of symtpoms (MET)  2. Consistently decr pain < or = 2/10 intermittent for incr QOL and activity tolerance (MET)  3. Overall incr in function as indicated by decr Oswestry <10%  4. painfree full AROM lumbar spine to allow painfree ADLs and upright walking posture (MET)  5. Pt able to ambulate at least 1 mile s gait deficits c little to no pain for PLOF  6. Pt able to sit at least 30 min bouts c proper support and posture s pain for PLOF(MET)  7. incr RLE MMT at least 1/2 grade painfree throughout for incr mm support for WB activities  8. indep HEP to promote cont progress toward functional goals      Plan: Continue skilled Physical Therapy 1 x/week or a total of 4 additional visits  (up to 12) over a 90 day period. Treatment will include: therapeutic exercise including ROM, strengthening, stretching program; neuromuscular re-education for balance and proprioception, pelvic and core stabilization, patient education for posture, body mechanics, ergonomics; modalities as needed; manual therapy to include joint mobilization, distraction, and soft tissue mobilization as needed; HEP instruction and progression        Patient/Family/Caregiver was advised of these findings, precautions, and treatment options and has agreed to actively participate in planning and for this course of care. Thank you for your referral. If you have any questions, please contact me at Dept: [phone]. Sincerely,  Electronically signed by therapist: Kevin Cullen PT     Physician's certification required:  Yes  Please co-sign or sign and return this letter via fax as soon as possible to [phone]. I certify the need for these services furnished under this plan of treatment and while under my care.     X___________________________________________________ Date____________________    Certification From: 3/7/6975  To:10/30/2023         Charges: TE x 1(16'); NR x 2 (24')   Total Timed Treatment: 40 min  Total Treatment Time: 40 min

## 2023-08-01 ENCOUNTER — OFFICE VISIT (OUTPATIENT)
Dept: PHYSICAL THERAPY | Facility: HOSPITAL | Age: 38
End: 2023-08-01
Attending: FAMILY MEDICINE
Payer: COMMERCIAL

## 2023-08-01 PROCEDURE — 97112 NEUROMUSCULAR REEDUCATION: CPT

## 2023-08-01 PROCEDURE — 97110 THERAPEUTIC EXERCISES: CPT

## 2023-08-14 ENCOUNTER — TELEPHONE (OUTPATIENT)
Dept: PHYSICAL THERAPY | Facility: HOSPITAL | Age: 38
End: 2023-08-14

## 2023-08-14 NOTE — TELEPHONE ENCOUNTER
Spoke with patient regarding PT for tomorrow. Doing well at this time, cancelling for tomorrow. Still have one on later in the month to utilize prn.

## 2023-08-15 ENCOUNTER — APPOINTMENT (OUTPATIENT)
Dept: PHYSICAL THERAPY | Facility: HOSPITAL | Age: 38
End: 2023-08-15
Attending: FAMILY MEDICINE
Payer: COMMERCIAL

## 2023-08-25 ENCOUNTER — APPOINTMENT (OUTPATIENT)
Dept: PHYSICAL THERAPY | Facility: HOSPITAL | Age: 38
End: 2023-08-25
Attending: FAMILY MEDICINE
Payer: COMMERCIAL

## 2023-08-25 ENCOUNTER — DOCUMENTATION ONLY (OUTPATIENT)
Dept: PHYSICAL THERAPY | Facility: HOSPITAL | Age: 38
End: 2023-08-25

## 2023-08-25 NOTE — PROGRESS NOTES
Patient presented to therapy session. Reports has not had any back pain in last month. Fair HEP compliance. Discussed can do 1 final session PT or does not need to. Patient elects to not do therapy session and feels ready for discharge. Quick review of HEP verbally.

## 2023-09-23 ENCOUNTER — HOSPITAL ENCOUNTER (OUTPATIENT)
Age: 38
Discharge: HOME | End: 2023-09-23
Payer: COMMERCIAL

## 2023-09-23 VITALS
HEIGHT: 71 IN | WEIGHT: 140 LBS | DIASTOLIC BLOOD PRESSURE: 71 MMHG | SYSTOLIC BLOOD PRESSURE: 135 MMHG | BODY MASS INDEX: 19.6 KG/M2 | RESPIRATION RATE: 16 BRPM | HEART RATE: 78 BPM | OXYGEN SATURATION: 99 % | TEMPERATURE: 98 F

## 2023-09-23 DIAGNOSIS — L25.5 CONTACT DERMATITIS DUE TO PLANTS, EXCEPT FOOD, UNSPECIFIED CONTACT DERMATITIS TYPE: Primary | ICD-10-CM

## 2023-09-23 PROCEDURE — 99213 OFFICE O/P EST LOW 20 MIN: CPT

## 2023-09-23 RX ORDER — TRIAMCINOLONE ACETONIDE 1 MG/G
CREAM TOPICAL 3 TIMES DAILY
Qty: 1 EACH | Refills: 0 | Status: SHIPPED | OUTPATIENT
Start: 2023-09-23 | End: 2023-09-30

## 2023-09-23 NOTE — ED INITIAL ASSESSMENT (HPI)
C/o scattered itchy rashes/bumps to both arms and both lower legs since yesterday. Did some yard work on Thursday.

## 2023-09-26 ENCOUNTER — TELEPHONE (OUTPATIENT)
Dept: FAMILY MEDICINE CLINIC | Facility: CLINIC | Age: 38
End: 2023-09-26

## 2023-09-26 NOTE — TELEPHONE ENCOUNTER
Left message for patient to return phone call. Patient in [facility] on 9/23 for skin rash. He was prescribed triamcinolone. Patient called back. He was doing yard work last week on Wednesday. He noticed blisters on Thursday on hands and ankles. Swollen and itchy. Blisters seem to be healing but swelling continues on his ankles. Triamcinolone does not relieve the itching at all. Instructed to take benadryl from  but does not help either. Benadryl made him drowsy so he took Claritin with no relief. Patient is asking for recommendations or an appointment on Friday if possible. Patient is unavailable tomorrow and Thursday. Will go back to IC if getting worse.

## 2023-09-26 NOTE — TELEPHONE ENCOUNTER
Pt called stating [facility] for [facility] rash. He said it's gotten worse, red bumps on both ankles, very itchy. He has no idea how this could have happened. Please advise.

## 2023-10-17 ENCOUNTER — OFFICE VISIT (OUTPATIENT)
Dept: FAMILY MEDICINE CLINIC | Facility: CLINIC | Age: 38
End: 2023-10-17

## 2023-10-17 VITALS
BODY MASS INDEX: 21.14 KG/M2 | HEIGHT: 71 IN | DIASTOLIC BLOOD PRESSURE: 80 MMHG | SYSTOLIC BLOOD PRESSURE: 102 MMHG | RESPIRATION RATE: 18 BRPM | TEMPERATURE: 98 F | WEIGHT: 151 LBS | HEART RATE: 97 BPM | OXYGEN SATURATION: 98 %

## 2023-10-17 DIAGNOSIS — G89.29 CHRONIC BILATERAL LOW BACK PAIN WITH RIGHT-SIDED SCIATICA: Primary | ICD-10-CM

## 2023-10-17 DIAGNOSIS — L85.8 KERATOSIS PILARIS: ICD-10-CM

## 2023-10-17 DIAGNOSIS — L98.8 SKIN MACULE: ICD-10-CM

## 2023-10-17 DIAGNOSIS — M54.41 CHRONIC BILATERAL LOW BACK PAIN WITH RIGHT-SIDED SCIATICA: Primary | ICD-10-CM

## 2023-10-17 PROCEDURE — 99213 OFFICE O/P EST LOW 20 MIN: CPT | Performed by: FAMILY MEDICINE

## 2023-10-17 PROCEDURE — 3074F SYST BP LT 130 MM HG: CPT | Performed by: FAMILY MEDICINE

## 2023-10-17 PROCEDURE — 3008F BODY MASS INDEX DOCD: CPT | Performed by: FAMILY MEDICINE

## 2023-10-17 PROCEDURE — 3079F DIAST BP 80-89 MM HG: CPT | Performed by: FAMILY MEDICINE

## 2023-10-29 ENCOUNTER — HOSPITAL ENCOUNTER (OUTPATIENT)
Age: 38
Discharge: HOME | End: 2023-10-29
Payer: COMMERCIAL

## 2023-10-29 VITALS
TEMPERATURE: 99 F | SYSTOLIC BLOOD PRESSURE: 136 MMHG | DIASTOLIC BLOOD PRESSURE: 95 MMHG | OXYGEN SATURATION: 98 % | RESPIRATION RATE: 20 BRPM | HEART RATE: 99 BPM

## 2023-10-29 DIAGNOSIS — J06.9 VIRAL URI WITH COUGH: Primary | ICD-10-CM

## 2023-10-29 LAB
POCT INFLUENZA A: NEGATIVE
POCT INFLUENZA B: NEGATIVE
S PYO AG THROAT QL IA.RAPID: NEGATIVE
SARS-COV-2 RNA RESP QL NAA+PROBE: NOT DETECTED

## 2023-10-29 PROCEDURE — 87502 INFLUENZA DNA AMP PROBE: CPT | Performed by: NURSE PRACTITIONER

## 2023-10-29 PROCEDURE — 87651 STREP A DNA AMP PROBE: CPT | Performed by: NURSE PRACTITIONER

## 2023-10-29 PROCEDURE — 99212 OFFICE O/P EST SF 10 MIN: CPT

## 2023-10-29 PROCEDURE — 99213 OFFICE O/P EST LOW 20 MIN: CPT

## 2023-10-29 NOTE — ED INITIAL ASSESSMENT (HPI)
C/o sore throat for 2 days. Pt reports fever, head/body aches, chills, productive cough with brown sputum. Pt reports family sick, denies recent travel. Pt reports otc meds used for symptoms.

## 2023-10-30 ENCOUNTER — TELEPHONE (OUTPATIENT)
Dept: FAMILY MEDICINE CLINIC | Facility: CLINIC | Age: 38
End: 2023-10-30

## 2023-10-30 DIAGNOSIS — L73.9 FOLLICULITIS: Primary | ICD-10-CM

## 2023-10-30 NOTE — TELEPHONE ENCOUNTER
Dr. Zina Carmichael,  please advise    Updated referral order pended for Dr. Funk Mediate with [address] [place] [address] if appropriate. Patient was seen by Dr. Jeaneth Ambriz today.     Dermatitis  Ddx includes eczematous dermatitis +/- impetiginization vs less-likely SSTI  - Reviewed nature of dermatitis with patient  - Discussed management options including empiric treatment with topical CS vs biopsy               - Opts empiric treatment  Plan:  - Prescribed TMC cream BID x 14 days               - If not improved at follow-up, recommend biopsy     RTC 2 weeks     Educational materials given (specify if applicable): Yes - Gina Liu MD (electronically signed on 10/30/2023 at 3:09 PM)

## 2023-10-30 NOTE — TELEPHONE ENCOUNTER
Spoke to pt who currently has a referral to see Dr. Dimitry Cabrera. However, it has been very difficult for the pt to get a scheduled appt with Dr. Daniel Pickens. Pt is requesting a referral to see dermatologist - Dr. Tian Alarcon MD phone #[phone].

## 2023-10-31 ENCOUNTER — PATIENT MESSAGE (OUTPATIENT)
Dept: FAMILY MEDICINE CLINIC | Facility: CLINIC | Age: 38
End: 2023-10-31

## 2023-10-31 ENCOUNTER — TELEPHONE (OUTPATIENT)
Dept: FAMILY MEDICINE CLINIC | Facility: CLINIC | Age: 38
End: 2023-10-31

## 2023-10-31 NOTE — TELEPHONE ENCOUNTER
From: Aislinn Breen  To: Deepthi Garcia  Sent: 10/31/2023 8:00 AM CDT  Subject: Referral     Can you send a referral to Doctor Narda Modi dermatologist with [facility]. thanks

## 2023-10-31 NOTE — TELEPHONE ENCOUNTER
Pt needs an updated referral.      He currently has one for Dr Stanislaw Carrizales - derm. Pt said he cannot get into him. He found a new derm which he made an jessica for tomorrow at 7:40 am.     New Derm name:  Dr. Josselyn De Paz. [phone]  [id number]

## 2023-11-16 ENCOUNTER — TELEPHONE (OUTPATIENT)
Dept: FAMILY MEDICINE CLINIC | Facility: CLINIC | Age: 38
End: 2023-11-16

## 2023-11-16 DIAGNOSIS — L30.9 DERMATITIS: ICD-10-CM

## 2023-11-16 DIAGNOSIS — L73.9 FOLLICULITIS: Primary | ICD-10-CM

## 2023-11-16 NOTE — TELEPHONE ENCOUNTER
Returned call to patient to inquire if he has been following POC per the last visit with Dr. Lolis Person. He was to be using a medicated cream BID for 14 days, and if no improvement, plan was for a biopsy. Patient does not recall that discussion. States he had an appointment today but it was cancelled. States he was told he needs an updated referral.  Referral for ongoing care placed as requested. He will call to reschedule appt.

## 2023-11-16 NOTE — TELEPHONE ENCOUNTER
Pt called asking for an jessica right away with Dr Franco Negron for a rash that keeps coming back. Needs an answer to why it keeps returning.

## 2023-11-25 ENCOUNTER — HOSPITAL ENCOUNTER (OUTPATIENT)
Age: 38
Discharge: HOME | End: 2023-11-25
Attending: EMERGENCY MEDICINE
Payer: COMMERCIAL

## 2023-11-25 VITALS
DIASTOLIC BLOOD PRESSURE: 84 MMHG | BODY MASS INDEX: 20.04 KG/M2 | HEIGHT: 70 IN | SYSTOLIC BLOOD PRESSURE: 133 MMHG | WEIGHT: 140 LBS | OXYGEN SATURATION: 98 % | TEMPERATURE: 98 F | HEART RATE: 111 BPM | RESPIRATION RATE: 18 BRPM

## 2023-11-25 DIAGNOSIS — R21 RASH: Primary | ICD-10-CM

## 2023-11-25 LAB
BILIRUB UR QL STRIP: NEGATIVE
CLARITY UR: CLEAR
COLOR UR: YELLOW
GLUCOSE UR STRIP-MCNC: NEGATIVE MG/DL
KETONES UR STRIP-MCNC: NEGATIVE MG/DL
LEUKOCYTE ESTERASE UR QL STRIP: NEGATIVE
NITRITE UR QL STRIP: NEGATIVE
PH UR STRIP: 7.5 [PH]
PROT UR STRIP-MCNC: 100 MG/DL
SP GR UR STRIP: 1.02
UROBILINOGEN UR STRIP-ACNC: <2 MG/DL

## 2023-11-25 PROCEDURE — 99213 OFFICE O/P EST LOW 20 MIN: CPT

## 2023-11-25 PROCEDURE — 81002 URINALYSIS NONAUTO W/O SCOPE: CPT

## 2023-11-25 RX ORDER — HYDROXYZINE HYDROCHLORIDE 25 MG/1
25 TABLET, FILM COATED ORAL EVERY 6 HOURS PRN
Qty: 20 TABLET | Refills: 0 | Status: SHIPPED | OUTPATIENT
Start: 2023-11-25 | End: 2023-12-25

## 2023-11-25 RX ORDER — PREDNISONE 20 MG/1
40 TABLET ORAL DAILY
Qty: 10 TABLET | Refills: 0 | Status: SHIPPED | OUTPATIENT
Start: 2023-11-25 | End: 2023-11-30

## 2023-11-25 NOTE — DISCHARGE INSTRUCTIONS
Take the prednisone once daily for the next 5 days, this should help with the rash. You can take the hydroxyzine as needed for itching but this is similar to Benadryl, do not take them together and do not work or drive when taking this as it will make you drowsy. Urinalysis is normal here, no evidence of infection.

## 2023-11-25 NOTE — ED INITIAL ASSESSMENT (HPI)
Patient c/o rash to chest. Patient treated last month for a rash on his arms which improved. Also c/o foul smell to urine.

## 2023-11-27 ENCOUNTER — TELEPHONE (OUTPATIENT)
Dept: FAMILY MEDICINE CLINIC | Facility: CLINIC | Age: 38
End: 2023-11-27

## 2023-11-27 ENCOUNTER — HOSPITAL ENCOUNTER (OUTPATIENT)
Dept: ULTRASOUND IMAGING | Age: 38
Discharge: HOME | End: 2023-11-27
Attending: STUDENT IN AN ORGANIZED HEALTH CARE EDUCATION/TRAINING PROGRAM
Payer: COMMERCIAL

## 2023-11-27 DIAGNOSIS — D48.5 NEOPLASM OF UNCERTAIN BEHAVIOR OF SKIN: ICD-10-CM

## 2023-11-27 PROCEDURE — 76705 ECHO EXAM OF ABDOMEN: CPT | Performed by: STUDENT IN AN ORGANIZED HEALTH CARE EDUCATION/TRAINING PROGRAM

## 2023-11-27 PROCEDURE — 88312 SPECIAL STAINS GROUP 1: CPT | Performed by: STUDENT IN AN ORGANIZED HEALTH CARE EDUCATION/TRAINING PROGRAM

## 2023-11-27 PROCEDURE — 88305 TISSUE EXAM BY PATHOLOGIST: CPT | Performed by: STUDENT IN AN ORGANIZED HEALTH CARE EDUCATION/TRAINING PROGRAM

## 2023-11-27 NOTE — PROGRESS NOTES
Called patient today to report results of recent ultrasound. Spoke with the patient and informed him that his ultrasound demonstrated a 1.4 x 1.2 cm subcutaneous nodule most c/w lipoma. Reviewed nature of lesion with patient and dicussed management options including observation vs excision. At this time, patient opts for observation. He expressed understanding and had no further questions.

## 2024-02-21 ENCOUNTER — OFFICE VISIT (OUTPATIENT)
Dept: FAMILY MEDICINE CLINIC | Facility: CLINIC | Age: 39
End: 2024-02-21
Payer: COMMERCIAL

## 2024-02-21 VITALS
WEIGHT: 151 LBS | HEIGHT: 70 IN | SYSTOLIC BLOOD PRESSURE: 116 MMHG | HEART RATE: 84 BPM | DIASTOLIC BLOOD PRESSURE: 80 MMHG | OXYGEN SATURATION: 98 % | BODY MASS INDEX: 21.62 KG/M2 | TEMPERATURE: 98 F | RESPIRATION RATE: 18 BRPM

## 2024-02-21 DIAGNOSIS — Z00.00 LABORATORY EXAMINATION ORDERED AS PART OF A ROUTINE GENERAL MEDICAL EXAMINATION: ICD-10-CM

## 2024-02-21 DIAGNOSIS — D17.1 LIPOMA OF TORSO: Primary | ICD-10-CM

## 2024-02-21 DIAGNOSIS — G43.009 MIGRAINE WITHOUT AURA AND WITHOUT STATUS MIGRAINOSUS, NOT INTRACTABLE: ICD-10-CM

## 2024-02-21 PROCEDURE — 3074F SYST BP LT 130 MM HG: CPT | Performed by: FAMILY MEDICINE

## 2024-02-21 PROCEDURE — 99213 OFFICE O/P EST LOW 20 MIN: CPT | Performed by: FAMILY MEDICINE

## 2024-02-21 PROCEDURE — 3079F DIAST BP 80-89 MM HG: CPT | Performed by: FAMILY MEDICINE

## 2024-02-21 PROCEDURE — 3008F BODY MASS INDEX DOCD: CPT | Performed by: FAMILY MEDICINE

## 2024-02-21 NOTE — PROGRESS NOTES
Tavo Breen is a 39 year old male.  Chief Complaint   Patient presents with    Bump     On back     HPI:   Tavo Breen is a 39 year old male with no significant past medical history right side of his back, states did see the dermatologist and they did an ultrasound and told him it was a lipoma but wants to make sure he does not have to do anything else.  Denies any pain.  States has not increased in size.    Patient complaining of chronic headaches for the past 15 years, states has triggers like if he is not well-hydrated, does not eat on time, lack of sleep ends up getting a headache at least a couple of times a month, takes aspirin and that seems to help.  Patient states if he does not take the aspirin until he is taking it does end up with worsening headache and vomiting.  Does have light and sound sensitivity when the headache is severe. Has not had a headache in the past 1 month.    Would like to get his labs done.    ALLERGY:   No Known Allergies    MEDICATIONS:     Current Outpatient Medications   Medication Sig Dispense Refill    triamcinolone 0.1 % External Cream Apply 1 Application topically 2 (two) times daily. 45 g 2    fluocinonide 0.05 % External Cream Apply 1 Application topically 2 (two) times daily. 30 g 0    Cholecalciferol (VITAMIN D) 2000 UNITS Oral Tab Take by mouth.        Past Medical History:   Diagnosis Date    Anorexia 2011    Nausea alone     chronic    Underweight 2010    chronic    Unspecified vitamin D deficiency       Social History:  Social History     Socioeconomic History    Marital status:      Spouse name: Marissa    Number of children: 3   Occupational History    Occupation: RN     Employer: [employer]   Tobacco Use    Smoking status: Never    Smokeless tobacco: Never   Vaping Use    Vaping Use: Never used   Substance and Sexual Activity    Alcohol use: No    Drug use: No    Sexual activity: Yes     Partners: Female   Other Topics Concern     Caffeine Concern No    Stress Concern No    Weight Concern No    Special Diet Yes    Exercise Yes    Seat Belt Yes        REVIEW OF SYSTEMS:   A comprehensive 10 point review of systems was completed.  Pertinent positives and negatives noted in the the HPI.    EXAM:   /80   Pulse 84   Temp 98 °F (36.7 °C) (Temporal)   Resp 18   Ht 5' 10\" (1.778 m)   Wt 151 lb (68.5 kg)   SpO2 98%   BMI 21.67 kg/m²   GENERAL: well developed, well nourished,in no apparent distress  EYES: PERRLA, EOMI  SKIN: lipoma on the right mid back  LUNGS: clear to auscultation  CARDIO: RRR without murmur  NEURO: CN 2-12 normal, no focal motor or sensory deficits.    ASSESSMENT AND PLAN:   Tavo was seen today for bump.    Diagnoses and all orders for this visit:    Lipoma of torso     - reassured patient is very small and will monitor for now, benign lesion.    Migraine without aura and without status migrainosus, not intractable     - discussed with patient since he has triggers and is aware of them to avoid them.     - can continue to take aspirin as needed     - if headaches are frequent or not responding to aspirin to follow up.    Laboratory examination ordered as part of a routine general medical examination  -     CBC With Differential With Platelet; Future  -     Assay, Thyroid Stim Hormone; Future  -     Lipid Panel; Future  -     Comp Metabolic Panel (14); Future       The 21st Century Cures Act makes medical notes like these available to patients in the interest of transparency. Please be advised this is a medical document. Medical documents are intended to carry relevant information, facts as evident, and the clinical opinion of the practitioner. The medical note is intended as peer to peer communication and may appear blunt or direct. It is written in medical language and may contain abbreviations or verbiage that are unfamiliar.

## 2024-04-14 ENCOUNTER — HOSPITAL ENCOUNTER (OUTPATIENT)
Age: 39
Discharge: HOME | End: 2024-04-14
Attending: EMERGENCY MEDICINE
Payer: COMMERCIAL

## 2024-04-14 VITALS
RESPIRATION RATE: 18 BRPM | DIASTOLIC BLOOD PRESSURE: 69 MMHG | OXYGEN SATURATION: 98 % | SYSTOLIC BLOOD PRESSURE: 96 MMHG | HEART RATE: 68 BPM | TEMPERATURE: 97 F | BODY MASS INDEX: 22.4 KG/M2 | WEIGHT: 160 LBS | HEIGHT: 71 IN

## 2024-04-14 DIAGNOSIS — R10.9 ABDOMINAL PAIN OF UNKNOWN ETIOLOGY: Primary | ICD-10-CM

## 2024-04-14 PROCEDURE — 99213 OFFICE O/P EST LOW 20 MIN: CPT

## 2024-04-14 PROCEDURE — 99212 OFFICE O/P EST SF 10 MIN: CPT

## 2024-04-14 NOTE — ED PROVIDER NOTES
Patient Seen in: [facility]      History     Chief Complaint   Patient presents with    Abdomen/Flank Pain     Stated Complaint: Abdomen/Flank Pain    Subjective:   HPI    Patient comes to the immediate care with history of right flank pain which was present 3 days ago, lasted for a day or 2 and then resolved.  The discomfort was not episodic and not related to any physical activity.  He has had complete resolution and has no symptoms at this time.  The patient did have some concurrent anterior abdominal discomfort as well which was more distributed, vague and not as intense as the flank/back discomfort.  There was no fever or chills.  He has no history of trauma.  He has no symptoms involving his lower extremities and he has been able to ambulate normally.  As his discomfort has resolved, he noted a small, localized area of a possible mass or induration on the anterior right upper quadrant.  Of note, the patient does have previous history of lipoma.    Objective:   Past Medical History:    Anorexia    Nausea alone    chronic    Underweight    chronic    Unspecified vitamin D deficiency              Past Surgical History:   Procedure Laterality Date    Other surgical history  12-9-09    Dr. Corine botello    Upper gi endoscopy,biopsy  2/11/2011                Social History     Socioeconomic History    Marital status:      Spouse name: Marissa    Number of children: 3   Occupational History    Occupation: RN     Employer: [employer]   Tobacco Use    Smoking status: Never    Smokeless tobacco: Never   Vaping Use    Vaping status: Never Used   Substance and Sexual Activity    Alcohol use: No    Drug use: No    Sexual activity: Yes     Partners: Female   Other Topics Concern    Caffeine Concern No    Stress Concern No    Weight Concern No    Special Diet Yes    Exercise Yes    Seat Belt Yes              Review of Systems    Positive for stated complaint: Abdomen/Flank Pain  Other  systems are as noted in HPI.  Constitutional and vital signs reviewed.      All other systems reviewed and negative except as noted above.    Physical Exam     ED Triage Vitals [04/14/24 0830]   BP 96/69   Pulse 68   Resp 18   Temp 97.1 °F (36.2 °C)   Temp src Temporal   SpO2 98 %   O2 Device None (Room air)       Current:BP 96/69   Pulse 68   Temp 97.1 °F (36.2 °C) (Temporal)   Resp 18   Ht 180.3 cm (5' 11\")   Wt 72.6 kg   SpO2 98%   BMI 22.32 kg/m²         Physical Exam  Vitals and nursing note reviewed.   Constitutional:       General: He is not in acute distress.     Appearance: He is well-developed. He is not ill-appearing.   Abdominal:      General: Bowel sounds are normal. There is no distension.      Palpations: Abdomen is soft.      Tenderness: There is no abdominal tenderness. There is no guarding.      Comments: Patient has no abdominal discomfort to palpation.  Patient is noted to have a very localized, small area of focal induration in the right upper quadrant which is nontender.  It measures approximately 5 mm in diameter.  There is no fluctuance.  There is no overlying cutaneous induration or erythema.  Patient has a similar area in his right lateral abdominal wall which patient identifies as a lipoma which he has had present for many years.  It is nontender and unchanged from his baseline according to the patient's history.   Neurological:      Mental Status: He is alert and oriented to person, place, and time.              ED Course   Labs Reviewed - No data to display       Medications - No data to display             MDM      Patient comes to immediate care with a history of flank and abdominal discomfort which has resolved, but patient notes a area of very localized induration or mass in the right upper quadrant.  Differential diagnosis includes acute abdominal wall hernia, but patient has had no surgery or any other reason for a defect in the right upper quadrant abdominal wall.   Additionally, the remainder of patient's history and abdominal exam not suggest this.  I did discuss possible diagnostic options with the patient, including lab workup and abdominal CT.  However, after discussion and in particular discussion about the potential radiation risks, and his current asymptomatic state and quite benign exam, with shared decision making, he is opting to observe and follow-up with his primary physician.  He expressed understanding that he should return immediately for any acute change or worsening of symptoms.                                   Medical Decision Making      Disposition and Plan     Clinical Impression:  1. Abdominal pain of unknown etiology         Disposition:  Discharge  4/14/2024  9:14 am    Follow-up:  Deepthi Garcia MD  [address] [place] [id number]  [phone]    Call in 1 day            Medications Prescribed:  Discharge Medication List as of 4/14/2024  9:15 AM

## 2024-04-14 NOTE — ED INITIAL ASSESSMENT (HPI)
Pt c/o R low back pain starting Thursday, pain gone at present now having R upper quadrant pain, pt c/o  a mass in the area, denies n/v/d

## 2024-04-15 ENCOUNTER — OFFICE VISIT (OUTPATIENT)
Dept: FAMILY MEDICINE CLINIC | Facility: CLINIC | Age: 39
End: 2024-04-15
Payer: COMMERCIAL

## 2024-04-15 VITALS
OXYGEN SATURATION: 98 % | DIASTOLIC BLOOD PRESSURE: 80 MMHG | HEIGHT: 71 IN | TEMPERATURE: 98 F | RESPIRATION RATE: 18 BRPM | SYSTOLIC BLOOD PRESSURE: 110 MMHG | HEART RATE: 80 BPM | WEIGHT: 151 LBS | BODY MASS INDEX: 21.14 KG/M2

## 2024-04-15 DIAGNOSIS — M54.50 ACUTE RIGHT-SIDED LOW BACK PAIN WITHOUT SCIATICA: Primary | ICD-10-CM

## 2024-04-15 DIAGNOSIS — D17.1 LIPOMA OF ABDOMINAL WALL: ICD-10-CM

## 2024-04-15 DIAGNOSIS — L70.8 OTHER ACNE: ICD-10-CM

## 2024-04-15 PROCEDURE — 3074F SYST BP LT 130 MM HG: CPT | Performed by: FAMILY MEDICINE

## 2024-04-15 PROCEDURE — 99213 OFFICE O/P EST LOW 20 MIN: CPT | Performed by: FAMILY MEDICINE

## 2024-04-15 PROCEDURE — 3079F DIAST BP 80-89 MM HG: CPT | Performed by: FAMILY MEDICINE

## 2024-04-15 PROCEDURE — 3008F BODY MASS INDEX DOCD: CPT | Performed by: FAMILY MEDICINE

## 2024-04-15 NOTE — PROGRESS NOTES
Tavo Breen is a 39 year old male.  Chief Complaint   Patient presents with    Low Back Pain     4 or 5 days     HPI:   Tavo Breen is a 39 year old male with no significant past medical history seen for follow up from urgent care.  Patient states was experiencing low back, flank and abdominal pain on the right side for 3-4 days, went to urgent care yesterday, currently pain has resolved. States pain started after he rode his bike uphill ans was standing while peddling.    States felt a small lump on the right side of his abdomen and wants it looked at, no pain.    Complaining that her has been breaking out and getting pimples on the face, neck, back and upper arms, has use otc wash with no relief.    ALLERGY:   No Known Allergies    MEDICATIONS:     Current Outpatient Medications   Medication Sig Dispense Refill    Cholecalciferol (VITAMIN D) 2000 UNITS Oral Tab Take by mouth.        Past Medical History:    Anorexia    Nausea alone    chronic    Underweight    chronic    Unspecified vitamin D deficiency      Social History:  Social History     Socioeconomic History    Marital status:      Spouse name: Marissa    Number of children: 3   Occupational History    Occupation: RN     Employer: [employer]   Tobacco Use    Smoking status: Never    Smokeless tobacco: Never   Vaping Use    Vaping status: Never Used   Substance and Sexual Activity    Alcohol use: No    Drug use: No    Sexual activity: Yes     Partners: Female   Other Topics Concern    Caffeine Concern No    Stress Concern No    Weight Concern No    Special Diet Yes    Exercise Yes    Seat Belt Yes        REVIEW OF SYSTEMS:   A comprehensive 10 point review of systems was completed.  Pertinent positives and negatives noted in the the HPI.      EXAM:   /80   Pulse 80   Temp 98 °F (36.7 °C) (Temporal)   Resp 18   Ht 5' 11\" (1.803 m)   Wt 151 lb (68.5 kg)   SpO2 98%   BMI 21.06 kg/m²   GENERAL: well developed,  well nourished,in no apparent distress  SKIN: acne on neck   LUNGS: clear to auscultation  CARDIO: RRR without murmur  ABDOMEN: soft, small 4-5 mm palpable lump in the right mid quadrant of abdomen, non tender.  BACK: no lumbar lordosis, no muscle spasm, no tenderness to palpation over the lumbar spine, normal ROM    ASSESSMENT AND PLAN:   Tavo was seen today for low back pain.    Diagnoses and all orders for this visit:    Acute right-sided low back pain without sciatica resolved      - advised to stretch before riding his bike.      -  since symptoms resolved do not recommend imaging ot PT at this time    Lipoma of abdominal wall  -     Derm Referral - In Network  -     reassured patient    Other acne  -     Derm Referral - In Network       The 21st Century Cures Act makes medical notes like these available to patients in the interest of transparency. Please be advised this is a medical document. Medical documents are intended to carry relevant information, facts as evident, and the clinical opinion of the practitioner. The medical note is intended as peer to peer communication and may appear blunt or direct. It is written in medical language and may contain abbreviations or verbiage that are unfamiliar.

## 2024-04-24 ENCOUNTER — HOSPITAL ENCOUNTER (OUTPATIENT)
Dept: ULTRASOUND IMAGING | Facility: HOSPITAL | Age: 39
Discharge: HOME | End: 2024-04-24
Attending: STUDENT IN AN ORGANIZED HEALTH CARE EDUCATION/TRAINING PROGRAM
Payer: COMMERCIAL

## 2024-04-24 DIAGNOSIS — D48.5 NEOPLASM OF UNCERTAIN BEHAVIOR OF SKIN: ICD-10-CM

## 2024-04-24 DIAGNOSIS — R22.9 SKIN NODULE: ICD-10-CM

## 2024-04-24 PROCEDURE — 76705 ECHO EXAM OF ABDOMEN: CPT | Performed by: STUDENT IN AN ORGANIZED HEALTH CARE EDUCATION/TRAINING PROGRAM

## 2024-04-25 NOTE — PROGRESS NOTES
Called patient to discuss ultrasound results. Discussed evidence of a soft tissue lesion most c/w a lipoma. Reviewed results with radiologist (Dr. Durham) and lesion from 11/27/23 and 4/24/24 were separate, discreet nodules. Discussed management observation vs excision. Patient opts for observation. Patient without further questions.

## 2024-04-26 ENCOUNTER — OFFICE VISIT (OUTPATIENT)
Dept: FAMILY MEDICINE CLINIC | Facility: CLINIC | Age: 39
End: 2024-04-26
Payer: COMMERCIAL

## 2024-04-26 ENCOUNTER — TELEPHONE (OUTPATIENT)
Dept: FAMILY MEDICINE CLINIC | Facility: CLINIC | Age: 39
End: 2024-04-26

## 2024-04-26 VITALS
HEART RATE: 69 BPM | BODY MASS INDEX: 21.7 KG/M2 | WEIGHT: 155 LBS | TEMPERATURE: 98 F | DIASTOLIC BLOOD PRESSURE: 80 MMHG | RESPIRATION RATE: 18 BRPM | SYSTOLIC BLOOD PRESSURE: 122 MMHG | OXYGEN SATURATION: 98 % | HEIGHT: 71 IN

## 2024-04-26 DIAGNOSIS — D17.1 LIPOMA OF TORSO: Primary | ICD-10-CM

## 2024-04-26 PROCEDURE — 3008F BODY MASS INDEX DOCD: CPT | Performed by: FAMILY MEDICINE

## 2024-04-26 PROCEDURE — 3079F DIAST BP 80-89 MM HG: CPT | Performed by: FAMILY MEDICINE

## 2024-04-26 PROCEDURE — 99213 OFFICE O/P EST LOW 20 MIN: CPT | Performed by: FAMILY MEDICINE

## 2024-04-26 PROCEDURE — 3074F SYST BP LT 130 MM HG: CPT | Performed by: FAMILY MEDICINE

## 2024-04-26 NOTE — PROGRESS NOTES
Tavo Breen is a 39 year old male.  Chief Complaint   Patient presents with    Abdominal Pain     HPI:   Tavo Breen is a 39 year old male with no significant past medical history seen for follow-up of lipoma.  Patient complaining that he has been experiencing some discomfort 2-3 at its worst but is concerned and would like to discuss seeing a surgeon for removal.    ALLERGY:   No Known Allergies    MEDICATIONS:     Current Outpatient Medications   Medication Sig Dispense Refill    Cholecalciferol (VITAMIN D) 2000 UNITS Oral Tab Take by mouth.        Past Medical History:    Anorexia    Nausea alone    chronic    Underweight    chronic    Unspecified vitamin D deficiency      Social History:  Social History     Socioeconomic History    Marital status:      Spouse name: Marissa    Number of children: 3   Occupational History    Occupation: RN     Employer: [employer]   Tobacco Use    Smoking status: Never    Smokeless tobacco: Never   Vaping Use    Vaping status: Never Used   Substance and Sexual Activity    Alcohol use: No    Drug use: No    Sexual activity: Yes     Partners: Female   Other Topics Concern    Caffeine Concern No    Stress Concern No    Weight Concern No    Special Diet Yes    Exercise Yes    Seat Belt Yes        REVIEW OF SYSTEMS:   A comprehensive 10 point review of systems was completed.  Pertinent positives and negatives noted in the the HPI.      EXAM:   Temp 98 °F (36.7 °C) (Temporal)   Resp 18   Ht 5' 11\" (1.803 m)   SpO2 98%   BMI 21.06 kg/m²   GENERAL: well developed, well nourished,in no apparent distress  LUNGS: clear to auscultation  CARDIO: RRR without murmur  ABDOMEN: Firm palpable nodule in the right upper abdominal area, no tenderness to palpation    ASSESSMENT AND PLAN:   Tavo was seen today for abdominal pain.    Diagnoses and all orders for this visit:    Lipoma of torso  -     Surgery Referral - In Network         The [address] [place], [place] [address]  Act makes medical notes like these available to patients in the interest of transparency. Please be advised this is a medical document. Medical documents are intended to carry relevant information, facts as evident, and the clinical opinion of the practitioner. The medical note is intended as peer to peer communication and may appear blunt or direct. It is written in medical language and may contain abbreviations or verbiage that are unfamiliar.

## 2024-04-26 NOTE — TELEPHONE ENCOUNTER
Pt sent [employer] message asking \"  Can you ask dr Garcia to place a referral to dr. Ector Pradhan MD Plastic surgeon to remove my Lipoma.thanks   \"    Can we please change referral? Please advise

## 2024-05-03 ENCOUNTER — TELEPHONE (OUTPATIENT)
Dept: SURGERY | Facility: CLINIC | Age: 39
End: 2024-05-03

## 2024-09-17 ENCOUNTER — OFFICE VISIT (OUTPATIENT)
Dept: FAMILY MEDICINE CLINIC | Facility: CLINIC | Age: 39
End: 2024-09-17
Payer: COMMERCIAL

## 2024-09-17 VITALS
TEMPERATURE: 98 F | BODY MASS INDEX: 21.56 KG/M2 | RESPIRATION RATE: 18 BRPM | DIASTOLIC BLOOD PRESSURE: 82 MMHG | HEIGHT: 71 IN | OXYGEN SATURATION: 98 % | WEIGHT: 154 LBS | SYSTOLIC BLOOD PRESSURE: 120 MMHG | HEART RATE: 85 BPM

## 2024-09-17 DIAGNOSIS — J02.9 ACUTE PHARYNGITIS, UNSPECIFIED ETIOLOGY: ICD-10-CM

## 2024-09-17 DIAGNOSIS — J06.9 VIRAL UPPER RESPIRATORY TRACT INFECTION: Primary | ICD-10-CM

## 2024-09-17 LAB
COVID19 BINAX NOW ANTIGEN: NOT DETECTED
OPERATOR ID: NORMAL
POCT LOT NUMBER: NORMAL

## 2024-09-17 PROCEDURE — 99213 OFFICE O/P EST LOW 20 MIN: CPT | Performed by: FAMILY MEDICINE

## 2024-09-17 PROCEDURE — 3079F DIAST BP 80-89 MM HG: CPT | Performed by: FAMILY MEDICINE

## 2024-09-17 PROCEDURE — 3008F BODY MASS INDEX DOCD: CPT | Performed by: FAMILY MEDICINE

## 2024-09-17 PROCEDURE — 3074F SYST BP LT 130 MM HG: CPT | Performed by: FAMILY MEDICINE

## 2024-09-17 NOTE — PROGRESS NOTES
Family Medicine Progress Note  ASSESSMENT AND PLAN:  Tavo Breen is a 39 year old male who is here for:     Tavo was seen today for sore throat and cough.    Diagnoses and all orders for this visit:    Viral upper respiratory tract infection  -     COVID19 BinaxNOW Antigen-- negative  -      advised otc decongestant  -      push fluids  -      tylenol or ibuprofen as needed for fever and body aches.    Acute pharyngitis, unspecified etiology  -     COVID19 BinaxNOW Antigen  -     advised sat water gargles and otc lozenges.         The patient indicates understanding of these issues and agrees to the plan.  Follow-Up: The patient is asked to Return if symptoms worsen or fail to improve.  .     Deepthi Garcia MD   09/17/24      CC: Sore Throat and Cough    HPI:   Tavo Breen is a 39 year old male complaining of sore throat, nasal congestion, body aches since yesterday, no fever, kids were sick at home.  Denies any fever.    ALLERGY:   No Known Allergies    MEDICATIONS:     Current Outpatient Medications   Medication Sig Dispense Refill    Cholecalciferol (VITAMIN D) 2000 UNITS Oral Tab Take by mouth.        Past Medical History:    Anorexia    Nausea alone    chronic    Underweight    chronic    Unspecified vitamin D deficiency      Social History:  Social History     Socioeconomic History    Marital status:      Spouse name: Marissa    Number of children: 3   Occupational History    Occupation: RN     Employer: [employer]   Tobacco Use    Smoking status: Never    Smokeless tobacco: Never   Vaping Use    Vaping status: Never Used   Substance and Sexual Activity    Alcohol use: No    Drug use: No    Sexual activity: Yes     Partners: Female   Other Topics Concern    Caffeine Concern No    Stress Concern No    Weight Concern No    Special Diet Yes    Exercise Yes    Seat Belt Yes        REVIEW OF SYSTEMS:   A comprehensive 10 point review of systems was completed.  Pertinent  positives and negatives noted in the the HPI.      EXAM:   /82   Pulse 85   Temp 97.9 °F (36.6 °C) (Temporal)   Resp 18   Ht 5' 11\" (1.803 m)   Wt 154 lb (69.9 kg)   SpO2 98%   BMI 21.48 kg/m²   GENERAL: well developed, well nourished,in no apparent distress  HEENT: atraumatic, normocephalic, bilateral external ear canals and TM's are normal, + oropharyngeal erythema  NECK: supple,mildly tender anterior cervical lymphadenopathy  LUNGS: clear to auscultation  CARDIO: RRR without murmur    Encounter took 16 min including taking history, performing physical exam, counseling and educating patient, answering patient questions and documenting in EHR.    NOTE TO PATIENT: The 21st Century Cures Act makes clinical notes like these available to patients in the interest of transparency. Clinical notes are medical documents used by physicians and care providers to communicate with each other. These documents include medical language and terminology, abbreviations, and treatment information that may sound technical and at times possibly unfamiliar. In addition, at times, the verbiage may appear blunt or direct. These documents are one tool providers use to communicate relevant information and clinical opinions of the care providers in a way that allows common understanding of the clinical context.      Deepthi Garcia MD    09/17/24 10:04 AM

## 2024-09-24 ENCOUNTER — LAB ENCOUNTER (OUTPATIENT)
Dept: LAB | Age: 39
End: 2024-09-24
Attending: FAMILY MEDICINE
Payer: COMMERCIAL

## 2024-09-24 DIAGNOSIS — Z00.00 LABORATORY EXAMINATION ORDERED AS PART OF A ROUTINE GENERAL MEDICAL EXAMINATION: ICD-10-CM

## 2024-09-24 LAB
ALBUMIN SERPL-MCNC: 4.9 G/DL (ref 3.2–4.8)
ALBUMIN/GLOB SERPL: 1.5 {RATIO} (ref 1–2)
ALP LIVER SERPL-CCNC: 95 U/L
ALT SERPL-CCNC: 23 U/L
ANION GAP SERPL CALC-SCNC: 7 MMOL/L (ref 0–18)
AST SERPL-CCNC: 18 U/L (ref ?–34)
BASOPHILS # BLD AUTO: 0.06 X10(3) UL (ref 0–0.2)
BASOPHILS NFR BLD AUTO: 0.7 %
BILIRUB SERPL-MCNC: 0.5 MG/DL (ref 0.3–1.2)
BUN BLD-MCNC: 13 MG/DL (ref 9–23)
CALCIUM BLD-MCNC: 10 MG/DL (ref 8.7–10.4)
CHLORIDE SERPL-SCNC: 104 MMOL/L (ref 98–112)
CHOLEST SERPL-MCNC: 212 MG/DL (ref ?–200)
CO2 SERPL-SCNC: 28 MMOL/L (ref 21–32)
CREAT BLD-MCNC: 1.05 MG/DL
EGFRCR SERPLBLD CKD-EPI 2021: 92 ML/MIN/1.73M2 (ref 60–?)
EOSINOPHIL # BLD AUTO: 0.3 X10(3) UL (ref 0–0.7)
EOSINOPHIL NFR BLD AUTO: 3.6 %
ERYTHROCYTE [DISTWIDTH] IN BLOOD BY AUTOMATED COUNT: 14 %
FASTING PATIENT LIPID ANSWER: YES
FASTING STATUS PATIENT QL REPORTED: YES
GLOBULIN PLAS-MCNC: 3.2 G/DL (ref 2–3.5)
GLUCOSE BLD-MCNC: 103 MG/DL (ref 70–99)
HCT VFR BLD AUTO: 45.2 %
HDLC SERPL-MCNC: 36 MG/DL (ref 40–59)
HGB BLD-MCNC: 14.1 G/DL
IMM GRANULOCYTES # BLD AUTO: 0.02 X10(3) UL (ref 0–1)
IMM GRANULOCYTES NFR BLD: 0.2 %
LDLC SERPL CALC-MCNC: 159 MG/DL (ref ?–100)
LYMPHOCYTES # BLD AUTO: 2.92 X10(3) UL (ref 1–4)
LYMPHOCYTES NFR BLD AUTO: 35 %
MCH RBC QN AUTO: 25 PG (ref 26–34)
MCHC RBC AUTO-ENTMCNC: 31.2 G/DL (ref 31–37)
MCV RBC AUTO: 80 FL
MONOCYTES # BLD AUTO: 0.4 X10(3) UL (ref 0.1–1)
MONOCYTES NFR BLD AUTO: 4.8 %
NEUTROPHILS # BLD AUTO: 4.64 X10 (3) UL (ref 1.5–7.7)
NEUTROPHILS # BLD AUTO: 4.64 X10(3) UL (ref 1.5–7.7)
NEUTROPHILS NFR BLD AUTO: 55.7 %
NONHDLC SERPL-MCNC: 176 MG/DL (ref ?–130)
OSMOLALITY SERPL CALC.SUM OF ELEC: 288 MOSM/KG (ref 275–295)
PLATELET # BLD AUTO: 217 10(3)UL (ref 150–450)
POTASSIUM SERPL-SCNC: 4 MMOL/L (ref 3.5–5.1)
PROT SERPL-MCNC: 8.1 G/DL (ref 5.7–8.2)
RBC # BLD AUTO: 5.65 X10(6)UL
SODIUM SERPL-SCNC: 139 MMOL/L (ref 136–145)
TRIGL SERPL-MCNC: 94 MG/DL (ref 30–149)
TSI SER-ACNC: 0.78 MIU/ML (ref 0.55–4.78)
VLDLC SERPL CALC-MCNC: 18 MG/DL (ref 0–30)
WBC # BLD AUTO: 8.3 X10(3) UL (ref 4–11)

## 2024-09-24 PROCEDURE — 80053 COMPREHEN METABOLIC PANEL: CPT

## 2024-09-24 PROCEDURE — 80061 LIPID PANEL: CPT

## 2024-09-24 PROCEDURE — 84443 ASSAY THYROID STIM HORMONE: CPT

## 2024-09-24 PROCEDURE — 85025 COMPLETE CBC W/AUTO DIFF WBC: CPT

## 2024-09-24 PROCEDURE — 36415 COLL VENOUS BLD VENIPUNCTURE: CPT

## 2024-10-07 ENCOUNTER — OFFICE VISIT (OUTPATIENT)
Dept: FAMILY MEDICINE CLINIC | Facility: CLINIC | Age: 39
End: 2024-10-07
Payer: COMMERCIAL

## 2024-10-07 VITALS
DIASTOLIC BLOOD PRESSURE: 80 MMHG | TEMPERATURE: 98 F | BODY MASS INDEX: 21.28 KG/M2 | SYSTOLIC BLOOD PRESSURE: 120 MMHG | WEIGHT: 152 LBS | OXYGEN SATURATION: 98 % | HEIGHT: 71 IN | HEART RATE: 63 BPM | RESPIRATION RATE: 18 BRPM

## 2024-10-07 DIAGNOSIS — D17.1 LIPOMA OF ABDOMINAL WALL: ICD-10-CM

## 2024-10-07 DIAGNOSIS — Z00.00 ROUTINE GENERAL MEDICAL EXAMINATION AT A HEALTH CARE FACILITY: Primary | ICD-10-CM

## 2024-10-07 DIAGNOSIS — M54.41 ACUTE BILATERAL LOW BACK PAIN WITH RIGHT-SIDED SCIATICA: ICD-10-CM

## 2024-10-07 DIAGNOSIS — E78.00 PURE HYPERCHOLESTEROLEMIA: ICD-10-CM

## 2024-10-07 NOTE — PROGRESS NOTES
Family Medicine Progress Note    Tavo Breen is a 39 year old male.  ASSESSMENT AND PLAN:  Tavo was seen today for physical.    Diagnoses and all orders for this visit:    Routine general medical examination at a health care facility    Pure hypercholesterolemia       - encouraged to limit red meat and foods rich in saturated fats and cholesterol       - exercise regularly    Lipoma of abdominal wall  -     Surgery Referral - In Network    Acute bilateral low back pain with right-sided sciatica  -     Physical Therapy Referral - [facility]    Age appropriate anticipatory guidance reviewed  Health Maintenance   Topic Date Due    Annual Physical  06/23/2023    COVID-19 Vaccine (4 - 2023-24 season) 09/01/2024    Influenza Vaccine (1) 06/30/2025 (Originally 10/1/2024)    DTaP,Tdap,and Td Vaccines (3 - Td or Tdap) 02/23/2032    Annual Depression Screening  Completed    Pneumococcal Vaccine: Birth to 64yrs  Aged Out         The patient indicates understanding of these issues and agrees to the plan.  Follow-Up: The patient is asked to return in Return in about 1 year (around 10/7/2025) for annual.      Deepthi Garcia MD   10/07/24      HPI:   Tavo Breen is a 39 year old male with no significant past medical history seen for his annual physical.    Patient states tries to eat healthy and does exercise.    Would like to see a surgeon to get the lipoma removed as it has been bothering him.    Low back pain for the past 2 to 3 days, radiating to his right leg, patient states has had sciatica before and physical therapy helped, would like a referral.     PAST MEDICAL HISTORY:     Past Medical History:    Anorexia    Nausea alone    chronic    Underweight    chronic    Unspecified vitamin D deficiency     PAST SURGICAL HISTORY:     Past Surgical History:   Procedure Laterality Date    Other surgical history  12-9-09    Dr. Corine botello    Upper gi endoscopy,biopsy   2/11/2011     ALLERGY:   No Known Allergies  MEDICATIONS:     Current Outpatient Medications   Medication Sig Dispense Refill    Cholecalciferol (VITAMIN D) 2000 UNITS Oral Tab Take by mouth.       FAMILY HISTORY:     Family History   Problem Relation Age of Onset    Hypertension Mother     Lipids Father     Other (brain tumor) Maternal Grandmother     Diabetes Paternal Grandfather      SOCIAL HISTORY:     Social History     Socioeconomic History    Marital status:      Spouse name: Marissa    Number of children: 3   Occupational History    Occupation: RN     Employer: [employer]   Tobacco Use    Smoking status: Never    Smokeless tobacco: Never   Vaping Use    Vaping status: Never Used   Substance and Sexual Activity    Alcohol use: No    Drug use: No    Sexual activity: Yes     Partners: Female   Other Topics Concern    Caffeine Concern No    Stress Concern No    Weight Concern No    Special Diet Yes    Exercise Yes    Seat Belt Yes     REVIEW OF SYSTEMS:   Review of Systems   Constitutional:  Negative for appetite change, fatigue, fever and unexpected weight change.   HENT:  Negative for congestion, ear pain, hearing loss and sore throat.    Eyes:  Negative for discharge, redness and visual disturbance.   Respiratory:  Negative for cough, chest tightness and shortness of breath.    Cardiovascular:  Negative for chest pain and palpitations.   Gastrointestinal:  Negative for abdominal pain, blood in stool, constipation and nausea.   Endocrine: Negative for cold intolerance, heat intolerance and polyuria.   Genitourinary:  Negative for difficulty urinating, dysuria, frequency and urgency.   Musculoskeletal:  Positive for back pain. Negative for arthralgias, gait problem, joint swelling and myalgias.   Skin:  Negative for rash.   Allergic/Immunologic: Negative for food allergies.   Neurological:  Negative for dizziness, weakness, numbness and headaches.   Hematological:  Negative for  adenopathy. Does not bruise/bleed easily.   Psychiatric/Behavioral:  Negative for dysphoric mood and sleep disturbance. The patient is not nervous/anxious.         PHYSICAL EXAM:   /80   Pulse 63   Temp 98 °F (36.7 °C) (Temporal)   Resp 18   Ht 5' 11\" (1.803 m)   Wt 152 lb (68.9 kg)   SpO2 98%   BMI 21.20 kg/m²     Physical Exam  Constitutional:       General: He is not in acute distress.     Appearance: Normal appearance. He is well-developed and normal weight.   HENT:      Head: Normocephalic and atraumatic.      Right Ear: Tympanic membrane, ear canal and external ear normal.      Left Ear: Tympanic membrane, ear canal and external ear normal.      Nose: Nose normal.      Mouth/Throat:      Mouth: Mucous membranes are moist.   Eyes:      Extraocular Movements: Extraocular movements intact.      Conjunctiva/sclera: Conjunctivae normal.      Pupils: Pupils are equal, round, and reactive to light.   Neck:      Thyroid: No thyromegaly.   Cardiovascular:      Rate and Rhythm: Normal rate and regular rhythm.      Pulses: Normal pulses.      Heart sounds: Normal heart sounds. No murmur heard.  Pulmonary:      Effort: Pulmonary effort is normal. No respiratory distress.      Breath sounds: Normal breath sounds.   Chest:      Chest wall: No tenderness.   Abdominal:      General: Bowel sounds are normal. There is no distension.      Palpations: Abdomen is soft. There is no hepatomegaly, splenomegaly or mass.      Tenderness: There is no abdominal tenderness.      Hernia: No hernia is present.      Comments: Lipoma on the right side of abdomen, non tender   Musculoskeletal:         General: Normal range of motion.      Cervical back: Normal range of motion and neck supple.      Lumbar back: Tenderness present. No spasms. Normal range of motion. Negative right straight leg raise test and negative left straight leg raise test. No scoliosis.      Right lower leg: No edema.      Left lower leg: No edema.    Lymphadenopathy:      Cervical: No cervical adenopathy.   Skin:     General: Skin is warm.      Findings: No rash.   Neurological:      General: No focal deficit present.      Mental Status: He is alert and oriented to person, place, and time.      Cranial Nerves: No cranial nerve deficit.      Sensory: No sensory deficit.      Motor: No weakness.      Coordination: Coordination normal.      Gait: Gait normal.      Deep Tendon Reflexes: Reflexes are normal and symmetric. Reflexes normal.   Psychiatric:         Mood and Affect: Mood normal.         Behavior: Behavior normal.         Thought Content: Thought content normal.         Judgment: Judgment normal.        LABS AND IMAGING:     Component      Latest Ref Rng 9/24/2024   WBC      4.0 - 11.0 x10(3) uL 8.3    RBC      4.30 - 5.70 x10(6)uL 5.65    Hemoglobin      13.0 - 17.5 g/dL 14.1    Hematocrit      39.0 - 53.0 % 45.2    Platelet Count      150.0 - 450.0 10(3)uL 217.0    MCV      80.0 - 100.0 fL 80.0    MCH      26.0 - 34.0 pg 25.0 (L)    MCHC      31.0 - 37.0 g/dL 31.2    RDW      % 14.0    Prelim Neutrophil Abs      1.50 - 7.70 x10 (3) uL 4.64    Neutrophils Absolute      1.50 - 7.70 x10(3) uL 4.64    Lymphocytes Absolute      1.00 - 4.00 x10(3) uL 2.92    Monocytes Absolute      0.10 - 1.00 x10(3) uL 0.40    Eosinophils Absolute      0.00 - 0.70 x10(3) uL 0.30    Basophils Absolute      0.00 - 0.20 x10(3) uL 0.06    Immature Granulocyte Absolute      0.00 - 1.00 x10(3) uL 0.02    Neutrophils %      % 55.7    Lymphocytes %      % 35.0    Monocytes %      % 4.8    Eosinophils %      % 3.6    Basophils %      % 0.7    Immature Granulocyte %      % 0.2    Glucose      70 - 99 mg/dL 103 (H)    Sodium      136 - 145 mmol/L 139    Potassium      3.5 - 5.1 mmol/L 4.0    Chloride      98 - 112 mmol/L 104    Carbon Dioxide, Total      21.0 - 32.0 mmol/L 28.0    ANION GAP      0 - 18 mmol/L 7    BUN      9 - 23 mg/dL 13    CREATININE      0.70 - 1.30 mg/dL 1.05     CALCIUM      8.7 - 10.4 mg/dL 10.0    CALCULATED OSMOLALITY      275 - 295 mOsm/kg 288    EGFR      >=60 mL/min/1.73m2 92    AST (SGOT)      <34 U/L 18    ALT (SGPT)      10 - 49 U/L 23    ALKALINE PHOSPHATASE      45 - 117 U/L 95    Total Bilirubin      0.3 - 1.2 mg/dL 0.5    PROTEIN, TOTAL      5.7 - 8.2 g/dL 8.1    Albumin      3.2 - 4.8 g/dL 4.9 (H)    Globulin      2.0 - 3.5 g/dL 3.2    A/G Ratio      1.0 - 2.0  1.5    Patient Fasting for CMP? Yes    Cholesterol, Total      <200 mg/dL 212 (H)    HDL Cholesterol      40 - 59 mg/dL 36 (L)    Triglycerides      30 - 149 mg/dL 94    LDL Cholesterol Calc      <100 mg/dL 159 (H)    VLDL      0 - 30 mg/dL 18    NON-HDL CHOLESTEROL      <130 mg/dL 176 (H)    Patient Fasting for Lipid? Yes    TSH      0.550 - 4.780 mIU/mL 0.777       Legend:  (L) Low  (H) High       The 21st Century Cures Act makes medical notes like these available to patients in the interest of transparency. Please be advised this is a medical document. Medical documents are intended to carry relevant information, facts as evident, and the clinical opinion of the practitioner. The medical note is intended as peer to peer communication and may appear blunt or direct. It is written in medical language and may contain abbreviations or verbiage that are unfamiliar.     Deepthi Garcia MD

## 2024-10-09 ENCOUNTER — APPOINTMENT (OUTPATIENT)
Dept: GENERAL RADIOLOGY | Age: 39
End: 2024-10-09
Attending: EMERGENCY MEDICINE
Payer: COMMERCIAL

## 2024-10-09 ENCOUNTER — HOSPITAL ENCOUNTER (OUTPATIENT)
Age: 39
Discharge: HOME | End: 2024-10-09
Attending: EMERGENCY MEDICINE
Payer: COMMERCIAL

## 2024-10-09 VITALS
TEMPERATURE: 98 F | HEART RATE: 76 BPM | SYSTOLIC BLOOD PRESSURE: 122 MMHG | WEIGHT: 152 LBS | OXYGEN SATURATION: 98 % | DIASTOLIC BLOOD PRESSURE: 60 MMHG | HEIGHT: 71 IN | BODY MASS INDEX: 21.28 KG/M2 | RESPIRATION RATE: 16 BRPM

## 2024-10-09 DIAGNOSIS — R07.9 CHEST PAIN OF UNCERTAIN ETIOLOGY: Primary | ICD-10-CM

## 2024-10-09 LAB
#MXD IC: 0.4 X10ˆ3/UL (ref 0.1–1)
ATRIAL RATE: 68 BPM
BASOPHILS # BLD AUTO: 0.04 X10(3) UL (ref 0–0.2)
BASOPHILS NFR BLD AUTO: 0.5 %
BUN BLD-MCNC: 17 MG/DL (ref 7–18)
CHLORIDE BLD-SCNC: 101 MMOL/L (ref 98–112)
CO2 BLD-SCNC: 27 MMOL/L (ref 21–32)
CREAT BLD-MCNC: 1.2 MG/DL
EGFRCR SERPLBLD CKD-EPI 2021: 78 ML/MIN/1.73M2 (ref 60–?)
EOSINOPHIL # BLD AUTO: 0.21 X10(3) UL (ref 0–0.7)
EOSINOPHIL NFR BLD AUTO: 2.9 %
ERYTHROCYTE [DISTWIDTH] IN BLOOD BY AUTOMATED COUNT: 13.8 %
GLUCOSE BLD-MCNC: 86 MG/DL (ref 70–99)
HCT VFR BLD AUTO: 45 %
HCT VFR BLD AUTO: 46.2 %
HCT VFR BLD CALC: 47 %
HGB BLD-MCNC: 14.1 G/DL
HGB BLD-MCNC: 14.5 G/DL
IMM GRANULOCYTES # BLD AUTO: 0.01 X10(3) UL (ref 0–1)
IMM GRANULOCYTES NFR BLD: 0.1 %
ISTAT IONIZED CALCIUM FOR CHEM 8: 1.24 MMOL/L (ref 1.12–1.32)
LYMPHOCYTES # BLD AUTO: 2.4 X10ˆ3/UL (ref 1–4)
LYMPHOCYTES # BLD AUTO: 2.43 X10(3) UL (ref 1–4)
LYMPHOCYTES NFR BLD AUTO: 33.3 %
LYMPHOCYTES NFR BLD AUTO: 34.3 %
MCH RBC QN AUTO: 24.3 PG (ref 26–34)
MCH RBC QN AUTO: 25.2 PG (ref 26–34)
MCHC RBC AUTO-ENTMCNC: 30.5 G/DL (ref 31–37)
MCHC RBC AUTO-ENTMCNC: 32.2 G/DL (ref 31–37)
MCV RBC AUTO: 78.1 FL
MCV RBC AUTO: 79.5 FL (ref 80–100)
MIXED CELL %: 6.2 %
MONOCYTES # BLD AUTO: 0.35 X10(3) UL (ref 0.1–1)
MONOCYTES NFR BLD AUTO: 4.8 %
NEUTROPHILS # BLD AUTO: 4.25 X10 (3) UL (ref 1.5–7.7)
NEUTROPHILS # BLD AUTO: 4.25 X10(3) UL (ref 1.5–7.7)
NEUTROPHILS # BLD AUTO: 4.3 X10ˆ3/UL (ref 1.5–7.7)
NEUTROPHILS NFR BLD AUTO: 58.4 %
NEUTROPHILS NFR BLD AUTO: 59.5 %
P AXIS: 59 DEGREES
P-R INTERVAL: 158 MS
PLATELET # BLD AUTO: 193 10(3)UL (ref 150–450)
POTASSIUM BLD-SCNC: 4.1 MMOL/L (ref 3.6–5.1)
Q-T INTERVAL: 360 MS
QRS DURATION: 98 MS
QTC CALCULATION (BEZET): 382 MS
R AXIS: 105 DEGREES
RBC # BLD AUTO: 5.76 X10(6)UL
RBC # BLD AUTO: 5.81 X10ˆ6/UL
SODIUM BLD-SCNC: 140 MMOL/L (ref 136–145)
T AXIS: 47 DEGREES
TROPONIN I BLD-MCNC: <0.02 NG/ML
VENTRICULAR RATE: 68 BPM
WBC # BLD AUTO: 7.1 X10ˆ3/UL (ref 4–11)
WBC # BLD AUTO: 7.3 X10(3) UL (ref 4–11)

## 2024-10-09 PROCEDURE — 85025 COMPLETE CBC W/AUTO DIFF WBC: CPT | Performed by: EMERGENCY MEDICINE

## 2024-10-09 PROCEDURE — 71046 X-RAY EXAM CHEST 2 VIEWS: CPT | Performed by: EMERGENCY MEDICINE

## 2024-10-09 PROCEDURE — 80047 BASIC METABLC PNL IONIZED CA: CPT

## 2024-10-09 PROCEDURE — 84484 ASSAY OF TROPONIN QUANT: CPT

## 2024-10-09 PROCEDURE — 99215 OFFICE O/P EST HI 40 MIN: CPT

## 2024-10-09 PROCEDURE — 93005 ELECTROCARDIOGRAM TRACING: CPT

## 2024-10-09 PROCEDURE — 36415 COLL VENOUS BLD VENIPUNCTURE: CPT

## 2024-10-09 PROCEDURE — 93010 ELECTROCARDIOGRAM REPORT: CPT

## 2024-10-09 NOTE — ED PROVIDER NOTES
Patient Seen in: [facility]      History     Chief Complaint   Patient presents with    Chest Pain     Stated Complaint: chest pain muscle soreness    Subjective:   HPI      40 yo male with left sided chest pain that started at 11pm and lasted for an hour. Was able to fall asleep. This morning pain is mild and intermittent no radiation.no associated shortness of breath. No sweating. No recent illness or trauma. No cardiac risk factors.     Objective:     Past Medical History:    Anorexia    Nausea alone    chronic    Underweight    chronic    Unspecified vitamin D deficiency              Past Surgical History:   Procedure Laterality Date    Other surgical history  12-9-09    Dr. Corine botello    Upper gi endoscopy,biopsy  2/11/2011                No pertinent social history.            Review of Systems    Positive for stated complaint: chest pain muscle soreness  Other systems are as noted in HPI.  Constitutional and vital signs reviewed.      All other systems reviewed and negative except as noted above.    Physical Exam     ED Triage Vitals [10/09/24 0916]   /60   Pulse 76   Resp 16   Temp 97.9 °F (36.6 °C)   Temp src Temporal   SpO2 98 %   O2 Device None (Room air)       Current Vitals:   Vital Signs  BP: 122/60  Pulse: 76  Resp: 16  Temp: 97.9 °F (36.6 °C)  Temp src: Temporal    Oxygen Therapy  SpO2: 98 %  O2 Device: None (Room air)        Physical Exam  Vitals and nursing note reviewed.   Constitutional:       Appearance: Normal appearance. He is well-developed.   HENT:      Head: Normocephalic and atraumatic.   Cardiovascular:      Rate and Rhythm: Normal rate and regular rhythm.      Heart sounds: Normal heart sounds.   Pulmonary:      Effort: Pulmonary effort is normal. No respiratory distress.      Breath sounds: Normal breath sounds.   Chest:      Chest wall: No tenderness.   Skin:     General: Skin is warm and dry.      Capillary Refill: Capillary refill takes less than 2 seconds.    Neurological:      General: No focal deficit present.      Mental Status: He is alert.      Sensory: No sensory deficit.   Psychiatric:         Mood and Affect: Mood normal.         Behavior: Behavior normal.            ED Course     Labs Reviewed   CBC W AUTO DIFF - Abnormal; Notable for the following components:       Result Value    RBC 5.76 (*)     MCV 78.1 (*)     MCH 25.2 (*)     All other components within normal limits   POCT CBC - Abnormal; Notable for the following components:    RBC IC 5.81 (*)     MCV IC 79.5 (*)     MCH IC 24.3 (*)     MCHC IC 30.5 (*)     All other components within normal limits   POCT ISTAT CHEM8 CARTRIDGE - Normal   ISTAT TROPONIN - Normal     EKG    Rate, intervals and axes as noted on EKG Report.  Rate: 68  Rhythm: Sinus Rhythm  Reading: rightward axis, no STEMI. No change when compared with prior EKG                       MDM           Medical Decision Making  ACS, musculoskeletal etiology, pulmonary disease, GERD all in differential. EKG and CXR both reviewed by myself and are normal. Labs including cbc, chem and troponin are normal. Discharge home. Ibuprofen 400mg every six hours as needed for pain. Follow up with primary care this week.     Disposition and Plan     Clinical Impression:  1. Chest pain of uncertain etiology         Disposition:  Discharge  10/9/2024 10:39 am    Follow-up:  Deepthi Garcia MD  [address] [place] [id number]  [phone]    In 3 days            Medications Prescribed:  Discharge Medication List as of 10/9/2024 10:43 AM              Supplementary Documentation:

## 2024-10-09 NOTE — DISCHARGE INSTRUCTIONS
Ibuprofen over the counter for pain  Follow up with primary care, call today for follow up appointment  To ER if worse

## 2024-10-09 NOTE — ED INITIAL ASSESSMENT (HPI)
Pt here c/o lt upper chest pain.   States pain is intermittent.  Denies n/v.   Denies sob.   Denies any radiation of pain.    Describes pain as a dull ache.   Onset of symptoms started last night while in bed.

## 2024-11-21 ENCOUNTER — PATIENT MESSAGE (OUTPATIENT)
Dept: FAMILY MEDICINE CLINIC | Facility: CLINIC | Age: 39
End: 2024-11-21

## 2024-11-21 DIAGNOSIS — D17.1 LIPOMA OF ABDOMINAL WALL: Primary | ICD-10-CM

## 2024-12-05 ENCOUNTER — TELEPHONE (OUTPATIENT)
Dept: FAMILY MEDICINE CLINIC | Facility: CLINIC | Age: 39
End: 2024-12-05

## 2024-12-05 NOTE — TELEPHONE ENCOUNTER
Patient called with condition update. He was evaluated for lipoma of torso in April. He reports increased discomfort, feels like a pinching pain for the past 3-4 days. Motrin and tylenol don't help much. Ice helps temporarily. Patient recently scheduled with Gen Surg as advised back in April, scheduled end of Jan.     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 If not, he has appointment on 12/16. Any other recs?

## 2024-12-30 ENCOUNTER — APPOINTMENT (OUTPATIENT)
Dept: GENERAL RADIOLOGY | Age: 39
End: 2024-12-30
Attending: EMERGENCY MEDICINE
Payer: COMMERCIAL

## 2024-12-30 ENCOUNTER — HOSPITAL ENCOUNTER (OUTPATIENT)
Age: 39
Discharge: HOME | End: 2024-12-30
Attending: EMERGENCY MEDICINE
Payer: COMMERCIAL

## 2024-12-30 VITALS
SYSTOLIC BLOOD PRESSURE: 136 MMHG | RESPIRATION RATE: 16 BRPM | HEIGHT: 71 IN | WEIGHT: 146 LBS | TEMPERATURE: 99 F | HEART RATE: 104 BPM | OXYGEN SATURATION: 100 % | BODY MASS INDEX: 20.44 KG/M2 | DIASTOLIC BLOOD PRESSURE: 72 MMHG

## 2024-12-30 DIAGNOSIS — J11.1 INFLUENZA: Primary | ICD-10-CM

## 2024-12-30 LAB
POCT INFLUENZA A: POSITIVE
POCT INFLUENZA B: NEGATIVE
S PYO AG THROAT QL IA.RAPID: NEGATIVE
SARS-COV-2 RNA RESP QL NAA+PROBE: NOT DETECTED

## 2024-12-30 PROCEDURE — 87651 STREP A DNA AMP PROBE: CPT | Performed by: EMERGENCY MEDICINE

## 2024-12-30 PROCEDURE — 99214 OFFICE O/P EST MOD 30 MIN: CPT

## 2024-12-30 PROCEDURE — 87502 INFLUENZA DNA AMP PROBE: CPT | Performed by: EMERGENCY MEDICINE

## 2024-12-30 PROCEDURE — 71046 X-RAY EXAM CHEST 2 VIEWS: CPT | Performed by: EMERGENCY MEDICINE

## 2024-12-30 RX ORDER — BENZONATATE 100 MG/1
100 CAPSULE ORAL 3 TIMES DAILY PRN
Qty: 30 CAPSULE | Refills: 0 | Status: SHIPPED | OUTPATIENT
Start: 2024-12-30 | End: 2025-01-29

## 2024-12-30 RX ORDER — ACETAMINOPHEN 500 MG
1000 TABLET ORAL ONCE
Status: COMPLETED | OUTPATIENT
Start: 2024-12-30 | End: 2024-12-30

## 2024-12-30 RX ORDER — OSELTAMIVIR PHOSPHATE 75 MG/1
75 CAPSULE ORAL 2 TIMES DAILY
Qty: 10 CAPSULE | Refills: 0 | Status: SHIPPED | OUTPATIENT
Start: 2024-12-30 | End: 2025-01-04

## 2024-12-30 NOTE — ED PROVIDER NOTES
Patient Seen in: [facility]      History   No chief complaint on file.    Stated Complaint: Fever, cough, sore throat    Subjective:   HPI      Patient is a 39-year-old male presents emergency room with a history of cough cold congestion and bodyaches which has been ongoing over the last couple of days.  The patient's daughter recently had pneumonia.  The patient has had no history of any vomiting or diarrhea.  The patient states he feels feverish and has diffuse chills.  The patient has had some sore throat associated with symptoms.  The patient denies history of any other somatic complaints or discomfort at this time.    Objective:     Past Medical History:    Anorexia    Nausea alone    chronic    Underweight    chronic    Unspecified vitamin D deficiency              Past Surgical History:   Procedure Laterality Date    Other surgical history  12-9-09    Dr. Corine botello    Upper gi endoscopy,biopsy  2/11/2011                Social History     Socioeconomic History    Marital status:      Spouse name: Marissa    Number of children: 3   Occupational History    Occupation: RN     Employer: [employer]   Tobacco Use    Smoking status: Never    Smokeless tobacco: Never   Vaping Use    Vaping status: Never Used   Substance and Sexual Activity    Alcohol use: No    Drug use: No    Sexual activity: Yes     Partners: Female   Other Topics Concern    Caffeine Concern No    Stress Concern No    Weight Concern No    Special Diet Yes    Exercise Yes    Seat Belt Yes              Review of Systems    Positive for stated complaint: Fever, cough, sore throat  Other systems are as noted in HPI.  Constitutional and vital signs reviewed.      All other systems reviewed and negative except as noted above.    Physical Exam     ED Triage Vitals [12/30/24 0940]   /72   Pulse 104   Resp 16   Temp 99 °F (37.2 °C)   Temp src Oral   SpO2 100 %   O2 Device None (Room air)       Current Vitals:    Vital Signs  BP: 136/72  Pulse: 104  Resp: 16  Temp: 99 °F (37.2 °C)  Temp src: Oral    Oxygen Therapy  SpO2: 100 %  O2 Device: None (Room air)        Physical Exam  GENERAL: Well-developed, well-nourished male sitting up breathing easily in no apparent distress.  Patient is nontoxic in appearance.  HEENT: Head is normocephalic, atraumatic. Pupils are 4 mm equally round and reactive to light. Oropharynx is mildly erythematous no exudate or abscess. Mucous membranes are moist.  Tympanic membranes are negative bilaterally.  NECK: No stridor.  LUNGS: Clear to auscultation bilaterally with no wheeze. There is good equal air entry bilaterally.  HEART: Regular rate and rhythm. Normal S1, S2 no S3, or S4. No murmur.  NEURO: Patient is awake, alert and oriented to time place and person. Motor strength is 5 over 5 in all 4 extremities. There are no gross motor or sensory deficits appreciated.  Patient is answering all questions appropriately.        ED Course     Labs Reviewed   POCT FLU TEST - Abnormal; Notable for the following components:       Result Value    POCT INFLUENZA A Positive (*)     All other components within normal limits    Narrative:     This assay is a rapid molecular in vitro test utilizing nucleic acid amplification of influenza A and B viral RNA.   RAPID STREP A - Normal   RAPID SARS-COV-2 BY PCR - Normal   I personally reviewed the patient's chest x-ray images my individual interpretation shows no evidence of any acute infiltrate.  I also reviewed official radiology report which showed results as noted below.         XR CHEST PA + LAT CHEST (CPT=71046)    Result Date: 12/30/2024  CONCLUSION:  Negative exam.   LOCATION:  [facility]   Dictated by (CST): Nathaniel Vazquez DO on 12/30/2024 at 10:44 AM     Finalized by (CST): Nathaniel Vazquez DO on 12/30/2024 at 10:44 AM             MDM         10:44 patient sitting back and breathing easily in no apparent stress this time.  Patient awaiting x-ray results at this  time.  Will continue to observe at this time.        Medical Decision Making    Patient tested positive for influenza here in the ER was negative for COVID, and strep here in the ER.  Patient sitting back and breathing easily in no apparent distress this time.  Patient structured to encourage fluids and rest at home to take Tylenol and Motrin for any symptoms at home he was given prescription for Tamiflu and for Tessalon Perles for home instructions to return to the ER if any other problems arise.  Patient discharged to home at this time.  Disposition and Plan     Clinical Impression:  1. Influenza         Disposition:  Discharge  12/30/2024 10:47 am    Follow-up:  Deepthi Garcia MD  [address] [place] [address]  [phone]    In 2 days            Medications Prescribed:  Current Discharge Medication List        START taking these medications    Details   oseltamivir (TAMIFLU) 75 MG Oral Cap Take 1 capsule (75 mg total) by mouth 2 (two) times daily for 5 days.  Qty: 10 capsule, Refills: 0      benzonatate 100 MG Oral Cap Take 1 capsule (100 mg total) by mouth 3 (three) times daily as needed for cough.  Qty: 30 capsule, Refills: 0                 Supplementary Documentation:

## 2024-12-30 NOTE — DISCHARGE INSTRUCTIONS
Encourage fluids and rest at home  Motrin or tylenol for symptoms at home  Return to the ER if symptoms worsen or if any other problems arise

## 2025-01-15 ENCOUNTER — HOSPITAL ENCOUNTER (OUTPATIENT)
Age: 40
Discharge: HOME | End: 2025-01-15
Attending: EMERGENCY MEDICINE
Payer: COMMERCIAL

## 2025-01-15 VITALS
OXYGEN SATURATION: 98 % | RESPIRATION RATE: 20 BRPM | TEMPERATURE: 98 F | DIASTOLIC BLOOD PRESSURE: 76 MMHG | SYSTOLIC BLOOD PRESSURE: 127 MMHG | HEART RATE: 72 BPM

## 2025-01-15 DIAGNOSIS — U07.1 COVID-19: Primary | ICD-10-CM

## 2025-01-15 PROCEDURE — 99213 OFFICE O/P EST LOW 20 MIN: CPT

## 2025-01-15 RX ORDER — BENZONATATE 100 MG/1
100 CAPSULE ORAL 3 TIMES DAILY PRN
Qty: 20 CAPSULE | Refills: 0 | Status: SHIPPED | OUTPATIENT
Start: 2025-01-15

## 2025-01-15 NOTE — DISCHARGE INSTRUCTIONS
Follow-up with your primary care doctor  Take Paxlovid twice a day for 5 days  Continue Tylenol or Motrin for fever every 4-6 hours  Drink plenty of fluids for hydration  Return if any worsening symptoms or new concern

## 2025-01-15 NOTE — ED PROVIDER NOTES
Patient Seen in: [facility]      History     Chief Complaint   Patient presents with    Flu     Stated Complaint: Flu symptoms    Subjective:   HPI    40-year-old male presents to immediate care with complaints of fever chills sore throat cough.  Patient just had influenza approximately a week ago.  Patient performed a home COVID test today which came back positive.  He denies any productive sputum.  Denies any other exacerbating factors.    Objective:     Past Medical History:    Anorexia    Nausea alone    chronic    Underweight    chronic    Unspecified vitamin D deficiency              Past Surgical History:   Procedure Laterality Date    Other surgical history  12-9-09    Dr. Corine botello    Upper gi endoscopy,biopsy  2/11/2011                Social History     Socioeconomic History    Marital status:      Spouse name: Marissa    Number of children: 3   Occupational History    Occupation: RN     Employer: [employer]   Tobacco Use    Smoking status: Never    Smokeless tobacco: Never   Vaping Use    Vaping status: Never Used   Substance and Sexual Activity    Alcohol use: No    Drug use: No    Sexual activity: Yes     Partners: Female   Other Topics Concern    Caffeine Concern No    Stress Concern No    Weight Concern No    Special Diet Yes    Exercise Yes    Seat Belt Yes              Review of Systems    Positive for stated complaint: Flu symptoms  Other systems are as noted in HPI.  Constitutional and vital signs reviewed.      All other systems reviewed and negative except as noted above.    Physical Exam     ED Triage Vitals [01/15/25 1139]   /76   Pulse 72   Resp 20   Temp 97.9 °F (36.6 °C)   Temp src Oral   SpO2 98 %   O2 Device None (Room air)       Current Vitals:   Vital Signs  BP: 127/76  Pulse: 72  Resp: 20  Temp: 97.9 °F (36.6 °C)  Temp src: Oral    Oxygen Therapy  SpO2: 98 %  O2 Device: None (Room air)        Physical Exam  General: Alert and oriented. No  acute distress.  HEENT: Normocephalic. No evidence of trauma. Extraocular movements are intact.  Cardiovascular exam: Regular rate and rhythm  Lungs: Clear to auscultation bilaterally.  Abdomen: Soft, nondistended, nontender.  Extremities: No evidence of deformity. No clubbing or cyanosis.  Neuro: No focal deficit is noted.    ED Course   Labs Reviewed - No data to display  Patient will quarantine for the next 5 days.  Patient will continue to take Tylenol Motrin for fever.  Recommend follow-up as primary care doctor.  Paxlovid for 5 days     MDM   Patient was screened and evaluated during this visit.   As a treating physician attending to the patient, I determined, within reasonable clinical confidence and prior to discharge, that an emergency medical condition was not or was no longer present.  There was no indication for further evaluation, treatment or admission on an emergency basis.  Comprehensive verbal and written discharge and follow-up instructions were provided to help prevent relapse or worsening.  Patient was instructed to follow-up with her primary care provider for further evaluation and treatment, but to return immediately to the ER for worsening, concerning, new, changing or persisting symptoms.  I discussed the case with the patient and they had no questions, complaints, or concerns.  Patient felt comfortable going home.    ^^Please note that this report has been produced using speech recognition software and may contain errors related to that system including, but not limited to, errors in grammar, punctuation, and spelling, as well as words and phrases that possibly may have been recognized inappropriately.  If there are any questions or concerns, contact the dictating provider for clarification      Medical Decision Making      Disposition and Plan     Clinical Impression:  1. COVID-19         Disposition:  Discharge  1/15/2025 11:56 am    Follow-up:  Deepthi Garcia MD  [address]  [address] [address]  [phone]    Call   As needed, If symptoms worsen          Medications Prescribed:  Current Discharge Medication List        START taking these medications    Details   nirmatrelvir-ritonavir 300-100 MG Oral Tablet Therapy Pack Take two nirmatrelvir tablets (300mg) with one ritonavir tablet (100mg) together twice daily for 5 days.  Qty: 30 tablet, Refills: 0      !! benzonatate 100 MG Oral Cap Take 1 capsule (100 mg total) by mouth 3 (three) times daily as needed for cough.  Qty: 20 capsule, Refills: 0       !! - Potential duplicate medications found. Please discuss with provider.              Supplementary Documentation:

## 2025-01-24 ENCOUNTER — OFFICE VISIT (OUTPATIENT)
Facility: LOCATION | Age: 40
End: 2025-01-24
Payer: COMMERCIAL

## 2025-01-24 VITALS
HEART RATE: 66 BPM | DIASTOLIC BLOOD PRESSURE: 74 MMHG | SYSTOLIC BLOOD PRESSURE: 119 MMHG | OXYGEN SATURATION: 100 % | RESPIRATION RATE: 18 BRPM | TEMPERATURE: 98 F

## 2025-01-24 DIAGNOSIS — D17.1 LIPOMA OF ABDOMINAL WALL: Primary | ICD-10-CM

## 2025-01-24 DIAGNOSIS — D17.1 LIPOMA OF FLANK: ICD-10-CM

## 2025-01-24 PROCEDURE — 99203 OFFICE O/P NEW LOW 30 MIN: CPT | Performed by: SURGERY

## 2025-01-24 PROCEDURE — 3074F SYST BP LT 130 MM HG: CPT | Performed by: SURGERY

## 2025-01-24 PROCEDURE — 3078F DIAST BP <80 MM HG: CPT | Performed by: SURGERY

## 2025-01-24 NOTE — H&P
New Patient Visit Note       Active Problems      1. Lipoma of abdominal wall    2. Lipoma of flank        Chief Complaint   Chief Complaint   Patient presents with    New Patient     NP- Lipoma of Abdominal Wall and Right lower back- denies pain just tenderness        History of Present Illness   Pt seen at the request of Dr. Garcia for two lipomas.  Pt with a lipoma of the right flank and one on the abdominal wall.  Slight discomfort with direct pressure.  No significant change in size.  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      Allergies  Tavo has No Known Allergies.    Past Medical / Surgical / Social / Family History    The past medical and past surgical history have been reviewed by me today.    Past Medical History:    Anorexia    Nausea alone    chronic    Underweight    chronic    Unspecified vitamin D deficiency     Past Surgical History:   Procedure Laterality Date    Other surgical history  12-9-09    Dr. Corine botello    Upper gi endoscopy,biopsy  2/11/2011       The family history and social history have been reviewed by me today.    Family History   Problem Relation Age of Onset    Hypertension Mother     Lipids Father     Other (brain tumor) Maternal Grandmother     Diabetes Paternal Grandfather      Social History     Socioeconomic History    Marital status:      Spouse name: Marissa    Number of children: 3   Occupational History    Occupation: RN     Employer: [employer]   Tobacco Use    Smoking status: Never    Smokeless tobacco: Never   Vaping Use    Vaping status: Never Used   Substance and Sexual Activity    Alcohol use: No    Drug use: No    Sexual activity: Yes     Partners: Female   Other Topics Concern    Caffeine Concern No    Stress Concern No    Weight Concern No    Special Diet Yes    Exercise Yes    Seat Belt Yes        Current Outpatient Medications:     benzonatate 100 MG Oral Cap, Take 1 capsule (100 mg total) by mouth 3 (three) times  daily as needed for cough., Disp: 20 capsule, Rfl: 0    benzonatate 100 MG Oral Cap, Take 1 capsule (100 mg total) by mouth 3 (three) times daily as needed for cough., Disp: 30 capsule, Rfl: 0    Cholecalciferol (VITAMIN D) 2000 UNITS Oral Tab, Take by mouth. (Patient not taking: Reported on 1/24/2025), Disp: , Rfl:       Review of Systems  The Review of Systems has been reviewed by me during today.  Review of Systems   Constitutional:  Negative for chills, diaphoresis, fatigue, fever and unexpected weight change.   HENT:  Negative for hearing loss, nosebleeds, sore throat and trouble swallowing.    Respiratory:  Negative for apnea, cough, shortness of breath and wheezing.    Cardiovascular:  Negative for chest pain, palpitations and leg swelling.   Gastrointestinal:  Negative for abdominal distention, abdominal pain, anal bleeding, blood in stool, constipation, diarrhea, nausea and vomiting.   Genitourinary:  Negative for difficulty urinating, dysuria, frequency and urgency.   Musculoskeletal:  Negative for arthralgias and myalgias.   Skin:  Negative for color change and rash.   Neurological:  Negative for tremors, syncope and weakness.   Hematological:  Negative for adenopathy. Does not bruise/bleed easily.   Psychiatric/Behavioral:  Negative for behavioral problems and sleep disturbance.        Physical Findings   /74   Pulse 66   Temp 97.5 °F (36.4 °C) (Temporal)   Resp 18   SpO2 100%   Physical Exam  Vitals and nursing note reviewed.   Cardiovascular:      Rate and Rhythm: Normal rate and regular rhythm.   Pulmonary:      Effort: Pulmonary effort is normal.      Breath sounds: Normal breath sounds.   Abdominal:      Palpations: There is mass.          Comments: 2 x 2 cm well circumscribed, freely mobile, right flank lipoma.  3 x 2 cm well circumscribed, freely mobile, abdominal wall lipoma.   Musculoskeletal:      Cervical back: Normal range of motion.             Assessment   1. Lipoma of abdominal  wall    2. Lipoma of flank      Pt with an anterior wall and right flank lipoma.    Plan   Etiology and treatment options for a lipoma discussed with the patient.  Plan for an excision of the lipomas under anesthesia at [facility] on 2/7/25.  Procedure discussed with risks, benefits, alternatives.  Risks include but are not exclusive to infection, bleeding, and recurrence.  All questions answered.  Pt's wife present as well.       No orders of the defined types were placed in this encounter.      Imaging & Referrals   None    Follow Up  No follow-ups on file.    William Nina MD

## 2025-02-04 ENCOUNTER — TELEPHONE (OUTPATIENT)
Facility: LOCATION | Age: 40
End: 2025-02-04

## 2025-02-04 DIAGNOSIS — D17.1 FLANK LIPOMA: Primary | ICD-10-CM

## 2025-02-05 ENCOUNTER — MED REC SCAN ONLY (OUTPATIENT)
Dept: FAMILY MEDICINE CLINIC | Facility: CLINIC | Age: 40
End: 2025-02-05

## 2025-03-11 ENCOUNTER — TELEPHONE (OUTPATIENT)
Dept: FAMILY MEDICINE CLINIC | Facility: CLINIC | Age: 40
End: 2025-03-11

## 2025-03-11 NOTE — TELEPHONE ENCOUNTER
Spoke with patient he said he forgot about appointment, said he will re-schedule through my chart, patient asking for no show fee to be waived, message to sent to [facility].

## 2025-03-17 ENCOUNTER — TELEPHONE (OUTPATIENT)
Facility: LOCATION | Age: 40
End: 2025-03-17

## 2025-03-17 NOTE — TELEPHONE ENCOUNTER
The patient recently called stating that he has to ride for long distances on Saturday for a work meeting to [place]. The patient would like a call before the meeting to make sure it's okay to go and FYI he is not driving.    Call back # [phone]

## 2025-03-17 NOTE — TELEPHONE ENCOUNTER
Spoke with patient.  Patient advised after surgery patients are high risk for blood clots. Patient advised patients are encouraged to stretch legs frequently, and wear compression stockings in effort to reduce this risk.  Patient further advised patients are to avoid operating machinery if taking opiates and avoid driving within 8 hours of taking any narcotic.  Patient further advised to ask about travels 3/21 on the day of his procedure.  Patient verbalized understanding.

## 2025-03-18 ENCOUNTER — TELEPHONE (OUTPATIENT)
Facility: LOCATION | Age: 40
End: 2025-03-18

## 2025-03-18 DIAGNOSIS — D17.1 LIPOMA OF ABDOMINAL WALL: ICD-10-CM

## 2025-03-18 DIAGNOSIS — D17.1 LIPOMA OF FLANK: Primary | ICD-10-CM

## 2025-03-18 NOTE — TELEPHONE ENCOUNTER
Pt rescheduled surgery by Dr. Nina from 3/21/25 to 4/18/25.  No reason given by pt.  Pt expressed understanding.

## 2025-04-18 ENCOUNTER — HOSPITAL ENCOUNTER (OUTPATIENT)
Facility: HOSPITAL | Age: 40
Setting detail: HOSPITAL OUTPATIENT SURGERY
Discharge: HOME | End: 2025-04-18
Attending: SURGERY | Admitting: SURGERY
Payer: COMMERCIAL

## 2025-04-18 ENCOUNTER — ANESTHESIA EVENT (OUTPATIENT)
Dept: SURGERY | Facility: HOSPITAL | Age: 40
End: 2025-04-18
Payer: COMMERCIAL

## 2025-04-18 ENCOUNTER — ANESTHESIA (OUTPATIENT)
Dept: SURGERY | Facility: HOSPITAL | Age: 40
End: 2025-04-18
Payer: COMMERCIAL

## 2025-04-18 VITALS
SYSTOLIC BLOOD PRESSURE: 113 MMHG | HEART RATE: 78 BPM | OXYGEN SATURATION: 98 % | BODY MASS INDEX: 20.89 KG/M2 | WEIGHT: 149.19 LBS | DIASTOLIC BLOOD PRESSURE: 59 MMHG | RESPIRATION RATE: 12 BRPM | HEIGHT: 71 IN | TEMPERATURE: 97 F

## 2025-04-18 DIAGNOSIS — D17.1 LIPOMA OF FLANK: ICD-10-CM

## 2025-04-18 DIAGNOSIS — D17.1 LIPOMA OF ABDOMINAL WALL: ICD-10-CM

## 2025-04-18 DIAGNOSIS — G89.18 POSTOPERATIVE PAIN: Primary | ICD-10-CM

## 2025-04-18 PROCEDURE — 21930 EXC BACK LES SC < 3 CM: CPT | Performed by: SURGERY

## 2025-04-18 PROCEDURE — 22903 EXC ABD LES SC 3 CM/>: CPT | Performed by: SURGERY

## 2025-04-18 RX ORDER — HYDROCODONE BITARTRATE AND ACETAMINOPHEN 5; 325 MG/1; MG/1
2 TABLET ORAL EVERY 4 HOURS PRN
Refills: 0 | Status: DISCONTINUED | OUTPATIENT
Start: 2025-04-18 | End: 2025-04-18

## 2025-04-18 RX ORDER — PROCHLORPERAZINE EDISYLATE 5 MG/ML
5 INJECTION INTRAMUSCULAR; INTRAVENOUS EVERY 8 HOURS PRN
Status: DISCONTINUED | OUTPATIENT
Start: 2025-04-18 | End: 2025-04-18

## 2025-04-18 RX ORDER — OXYCODONE HYDROCHLORIDE 5 MG/1
5 TABLET ORAL EVERY 6 HOURS PRN
Qty: 5 TABLET | Refills: 0 | Status: SHIPPED | OUTPATIENT
Start: 2025-04-18

## 2025-04-18 RX ORDER — BUPIVACAINE HYDROCHLORIDE AND EPINEPHRINE 5; 5 MG/ML; UG/ML
INJECTION, SOLUTION EPIDURAL; INTRACAUDAL; PERINEURAL AS NEEDED
Status: DISCONTINUED | OUTPATIENT
Start: 2025-04-18 | End: 2025-04-18 | Stop reason: HOSPADM

## 2025-04-18 RX ORDER — HYDROMORPHONE HYDROCHLORIDE 1 MG/ML
0.6 INJECTION, SOLUTION INTRAMUSCULAR; INTRAVENOUS; SUBCUTANEOUS EVERY 5 MIN PRN
Status: DISCONTINUED | OUTPATIENT
Start: 2025-04-18 | End: 2025-04-18

## 2025-04-18 RX ORDER — LIDOCAINE HYDROCHLORIDE 10 MG/ML
INJECTION, SOLUTION EPIDURAL; INFILTRATION; INTRACAUDAL; PERINEURAL AS NEEDED
Status: DISCONTINUED | OUTPATIENT
Start: 2025-04-18 | End: 2025-04-18 | Stop reason: SURG

## 2025-04-18 RX ORDER — ACETAMINOPHEN 500 MG
1000 TABLET ORAL ONCE
Status: DISCONTINUED | OUTPATIENT
Start: 2025-04-18 | End: 2025-04-18 | Stop reason: HOSPADM

## 2025-04-18 RX ORDER — ONDANSETRON 2 MG/ML
INJECTION INTRAMUSCULAR; INTRAVENOUS AS NEEDED
Status: DISCONTINUED | OUTPATIENT
Start: 2025-04-18 | End: 2025-04-18 | Stop reason: SURG

## 2025-04-18 RX ORDER — HYDROMORPHONE HYDROCHLORIDE 1 MG/ML
0.2 INJECTION, SOLUTION INTRAMUSCULAR; INTRAVENOUS; SUBCUTANEOUS EVERY 5 MIN PRN
Status: DISCONTINUED | OUTPATIENT
Start: 2025-04-18 | End: 2025-04-18

## 2025-04-18 RX ORDER — HYDROMORPHONE HYDROCHLORIDE 1 MG/ML
0.4 INJECTION, SOLUTION INTRAMUSCULAR; INTRAVENOUS; SUBCUTANEOUS EVERY 5 MIN PRN
Status: DISCONTINUED | OUTPATIENT
Start: 2025-04-18 | End: 2025-04-18

## 2025-04-18 RX ORDER — HEPARIN SODIUM 5000 [USP'U]/ML
5000 INJECTION, SOLUTION INTRAVENOUS; SUBCUTANEOUS ONCE
Status: COMPLETED | OUTPATIENT
Start: 2025-04-18 | End: 2025-04-18

## 2025-04-18 RX ORDER — NALOXONE HYDROCHLORIDE 0.4 MG/ML
0.08 INJECTION, SOLUTION INTRAMUSCULAR; INTRAVENOUS; SUBCUTANEOUS AS NEEDED
Status: DISCONTINUED | OUTPATIENT
Start: 2025-04-18 | End: 2025-04-18

## 2025-04-18 RX ORDER — HYDROCODONE BITARTRATE AND ACETAMINOPHEN 5; 325 MG/1; MG/1
1 TABLET ORAL EVERY 4 HOURS PRN
Refills: 0 | Status: DISCONTINUED | OUTPATIENT
Start: 2025-04-18 | End: 2025-04-18

## 2025-04-18 RX ORDER — SODIUM CHLORIDE, SODIUM LACTATE, POTASSIUM CHLORIDE, CALCIUM CHLORIDE 600; 310; 30; 20 MG/100ML; MG/100ML; MG/100ML; MG/100ML
INJECTION, SOLUTION INTRAVENOUS CONTINUOUS
Status: DISCONTINUED | OUTPATIENT
Start: 2025-04-18 | End: 2025-04-18

## 2025-04-18 RX ORDER — SCOPOLAMINE 1 MG/3D
1 PATCH, EXTENDED RELEASE TRANSDERMAL ONCE
Status: DISCONTINUED | OUTPATIENT
Start: 2025-04-18 | End: 2025-04-18 | Stop reason: HOSPADM

## 2025-04-18 RX ORDER — ONDANSETRON 2 MG/ML
4 INJECTION INTRAMUSCULAR; INTRAVENOUS EVERY 6 HOURS PRN
Status: DISCONTINUED | OUTPATIENT
Start: 2025-04-18 | End: 2025-04-18

## 2025-04-18 RX ORDER — LIDOCAINE HYDROCHLORIDE 10 MG/ML
INJECTION, SOLUTION INFILTRATION; PERINEURAL AS NEEDED
Status: DISCONTINUED | OUTPATIENT
Start: 2025-04-18 | End: 2025-04-18 | Stop reason: HOSPADM

## 2025-04-18 RX ADMIN — SODIUM CHLORIDE, SODIUM LACTATE, POTASSIUM CHLORIDE, CALCIUM CHLORIDE: 600; 310; 30; 20 INJECTION, SOLUTION INTRAVENOUS at 09:36:00

## 2025-04-18 RX ADMIN — LIDOCAINE HYDROCHLORIDE 30 MG: 10 INJECTION, SOLUTION EPIDURAL; INFILTRATION; INTRACAUDAL; PERINEURAL at 09:10:00

## 2025-04-18 RX ADMIN — SODIUM CHLORIDE, SODIUM LACTATE, POTASSIUM CHLORIDE, CALCIUM CHLORIDE: 600; 310; 30; 20 INJECTION, SOLUTION INTRAVENOUS at 09:02:00

## 2025-04-18 RX ADMIN — ONDANSETRON 4 MG: 2 INJECTION INTRAMUSCULAR; INTRAVENOUS at 09:36:00

## 2025-04-18 NOTE — DISCHARGE INSTRUCTIONS
Home Care Instructions  Minor Surgery  Dr William Nina.    MEDICATIONS  Take Tylenol or Advil for pain. Take 1-2 tablets every 4-6 hours as needed. Pain medications will ease your pain, but you should expect some incisional pain for about 2-5 days. You should walk often, cough and take deep breathes.    DIET  You may resume a general diet. Do not eat excessively or drink alcohol.    WOUND CARE  You may remove any outer bandage the day after surgery. This includes any white tape or gauze dressing. Do not remove any inner bandages (steri-strips or butterfly bandages). They will be removed at your follow-up visit. If an inner bandage is greater than 50% peeled off, you may remove that piece of bandage only. You may shower 24 hours after surgery, but no baths until 2 weeks after surgery. Do not put any creams, salves, or ointments on the wound unless directed by your doctor.     ACTIVITY  No heavy lifting over 10 pounds for the next 6 weeks. After surgery, your driving reflexes will be slower. Therefore, you are restricted from driving until your reflexes are back to normal. You may walk about the house, go shopping, see a movie, or eat at a restaurant. You may also climb stairs, but no weight lifting, powerwalking, jogging, or using the “Stair-Master”. Remain off work until your follow-up visit or as instructed by your doctor.    APPOINTMENT  Please call our office for an appointment in 3-5 days after surgery, unless otherwise instructed. If you have fevers, chills, or if the wound becomes increasingly painful, red, or drains pus, please call us immediately at [phone].      Thank you for entrusting us with your care.  [facility]

## 2025-04-18 NOTE — BRIEF OP NOTE
Pre-Operative Diagnosis: Lipoma of abdominal wall [D17.1]  Lipoma of flank [D17.1]     Post-Operative Diagnosis: Lipoma of abdominal wall [D17.1]Lipoma of flank [D17.1]      Procedure Performed:   EXCISION OF ABDOMINAL WALL AND RIGHT FLANK LIPOMA    Surgeons and Role:     * William Nina MD - Primary    Assistant(s):        Surgical Findings: 3 x 2 x 2 cm abdominal wall lipoma.  2 x 2 x 1 cm right flank lipoma.     Specimen: As above.     Estimated Blood Loss: Blood Output: 2 mL (4/18/2025  9:29 AM)      Dictation Number:  [id number]    William Nina MD  4/18/2025  9:31 AM

## 2025-04-18 NOTE — H&P
History of Present Illness   Pt seen at the request of Dr. Garcia for two lipomas.  Pt with a lipoma of the right flank and one on the abdominal wall.  Slight discomfort with direct pressure.  No significant change in size.  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        Allergies  Tavo has No Known Allergies.     Past Medical / Surgical / Social / Family History    The past medical and past surgical history have been reviewed by me today.     Past Medical History       Past Medical History:    Anorexia    Nausea alone     chronic    Underweight     chronic    Unspecified vitamin D deficiency         Past Surgical History         Past Surgical History:   Procedure Laterality Date    Other surgical history   12-9-09     Dr. Corine botello    Upper gi endoscopy,biopsy   2/11/2011            The family history and social history have been reviewed by me today.     Family History         Family History   Problem Relation Age of Onset    Hypertension Mother      Lipids Father      Other (brain tumor) Maternal Grandmother      Diabetes Paternal Grandfather           Social Hx on file   Social History            Socioeconomic History    Marital status:        Spouse name: Marissa    Number of children: 3   Occupational History    Occupation: RN       Employer: [employer]   Tobacco Use    Smoking status: Never    Smokeless tobacco: Never   Vaping Use    Vaping status: Never Used   Substance and Sexual Activity    Alcohol use: No    Drug use: No    Sexual activity: Yes       Partners: Female   Other Topics Concern    Caffeine Concern No    Stress Concern No    Weight Concern No    Special Diet Yes    Exercise Yes    Seat Belt Yes           Medications - Current      Current Outpatient Medications:     benzonatate 100 MG Oral Cap, Take 1 capsule (100 mg total) by mouth 3 (three) times daily as needed for cough., Disp: 20 capsule, Rfl: 0    benzonatate 100 MG Oral Cap, Take 1 capsule  (100 mg total) by mouth 3 (three) times daily as needed for cough., Disp: 30 capsule, Rfl: 0    Cholecalciferol (VITAMIN D) 2000 UNITS Oral Tab, Take by mouth. (Patient not taking: Reported on 1/24/2025), Disp: , Rfl:          Review of Systems  The Review of Systems has been reviewed by me during today.  Review of Systems   Constitutional:  Negative for chills, diaphoresis, fatigue, fever and unexpected weight change.   HENT:  Negative for hearing loss, nosebleeds, sore throat and trouble swallowing.    Respiratory:  Negative for apnea, cough, shortness of breath and wheezing.    Cardiovascular:  Negative for chest pain, palpitations and leg swelling.   Gastrointestinal:  Negative for abdominal distention, abdominal pain, anal bleeding, blood in stool, constipation, diarrhea, nausea and vomiting.   Genitourinary:  Negative for difficulty urinating, dysuria, frequency and urgency.   Musculoskeletal:  Negative for arthralgias and myalgias.   Skin:  Negative for color change and rash.   Neurological:  Negative for tremors, syncope and weakness.   Hematological:  Negative for adenopathy. Does not bruise/bleed easily.   Psychiatric/Behavioral:  Negative for behavioral problems and sleep disturbance.          Physical Findings   /74   Pulse 66   Temp 97.5 °F (36.4 °C) (Temporal)   Resp 18   SpO2 100%   Physical Exam  Vitals and nursing note reviewed.   Cardiovascular:      Rate and Rhythm: Normal rate and regular rhythm.   Pulmonary:      Effort: Pulmonary effort is normal.      Breath sounds: Normal breath sounds.   Abdominal:      Palpations: There is mass.           Comments: 2 x 2 cm well circumscribed, freely mobile, right flank lipoma.  3 x 2 cm well circumscribed, freely mobile, abdominal wall lipoma.   Musculoskeletal:      Cervical back: Normal range of motion.            Assessment   1. Lipoma of abdominal wall    2. Lipoma of flank       Pt with an anterior wall and right flank lipoma.     Plan    Etiology and treatment options for a lipoma discussed with the patient.  Plan for an excision of the lipomas under anesthesia at [facility] on 4/18/25.  Procedure discussed with risks, benefits, alternatives.  Risks include but are not exclusive to infection, bleeding, and recurrence.  All questions answered.  Pt's wife present as well.    Pre-op Diagnosis: Lipoma of abdominal wall [D17.1]  Lipoma of flank [D17.1]    The above referenced H&P was reviewed by William Nina MD on 4/18/2025, the patient was examined and no significant changes have occurred in the patient's condition since the H&P was performed.  I discussed with the patient and/or legal representative the potential benefits, risks and side effects of this procedure; the likelihood of the patient achieving goals; and potential problems that might occur during recuperation.  I discussed reasonable alternatives to the procedure, including risks, benefits and side effects related to the alternatives and risks related to not receiving this procedure.  We will proceed with procedure as planned.

## 2025-04-18 NOTE — ANESTHESIA POSTPROCEDURE EVALUATION
[facility]    [facility]:  [facility]   Age/Gender 40 year old male MRN [id number]   Location [facility] SURGERY Attending William Nina MD   Hosp Day # 0 PCP Deepthi Garcia MD       Anesthesia Post-op Note    EXCISION OF ABDOMINAL WALL AND RIGHT FLANK LIPOMA    Procedure Summary       Date: 04/18/25 Room / Location:  MAIN OR [address] [place] [address] MAIN OR    Anesthesia Start: 0902 Anesthesia Stop: 0939    Procedure: EXCISION OF ABDOMINAL WALL AND RIGHT FLANK LIPOMA (Right: Flank) Diagnosis:       Lipoma of abdominal wall      Lipoma of flank      (Lipoma of abdominal wall [D17.1]Lipoma of flank [D17.1])    Surgeons: William Nina MD Anesthesiologist: Delvis Vinson MD    Anesthesia Type: MAC ASA Status: 1            Anesthesia Type: No value filed.    Vitals Value Taken Time   /63 04/18/25 09:39   Temp 96.8 04/18/25 09:39   Pulse 70 04/18/25 09:39   Resp 14 04/18/25 09:39   SpO2 96 04/18/25 09:39           Patient Location: Same Day Surgery    Anesthesia Type: MAC    Airway Patency: patent    Postop Pain Control: adequate    Mental Status: mildly sedated but able to meaningfully participate in the post-anesthesia evaluation    Nausea/Vomiting: none    Cardiopulmonary/Hydration status: stable euvolemic    Complications: no apparent anesthesia related complications    Postop vital signs: stable    Dental Exam: Unchanged from Preop    Patient to be discharged home when criteria met.

## 2025-04-18 NOTE — ANESTHESIA PREPROCEDURE EVALUATION
PRE-OP EVALUATION    Patient Name: Tavo Breen    Admit Diagnosis: Lipoma of abdominal wall [D17.1]  Lipoma of flank [D17.1]    Pre-op Diagnosis: Lipoma of abdominal wall [D17.1]  Lipoma of flank [D17.1]    EXCISION OF ABDOMINAL WALL AND RIGHT FLANK LIPOMA    Anesthesia Procedure: EXCISION OF ABDOMINAL WALL AND RIGHT FLANK LIPOMA (Right: Flank)    Surgeons and Role:     * William Nina MD - Primary    Pre-op vitals reviewed.  Temp: 97.8 °F (36.6 °C)  Pulse: 56  Resp: 16  BP: 118/77  SpO2: 100 %  Body mass index is 20.81 kg/m².    Current medications reviewed.  Hospital Medications:  Current Medications[1]    Outpatient Medications:   Prescriptions Prior to Admission[2]    Allergies: Patient has no known allergies.      Anesthesia Evaluation    Patient summary reviewed.    Anesthetic Complications  (-) history of anesthetic complications         GI/Hepatic/Renal    Negative GI/hepatic/renal ROS.                             Cardiovascular    Negative cardiovascular ROS.    Exercise tolerance: good     MET: >4                                           Endo/Other    Negative endo/other ROS.                              Pulmonary    Negative pulmonary ROS.                       Neuro/Psych    Negative neuro/psych ROS.                                  Past Surgical History[3]  Social Hx on file[4]  History   Drug Use No     Available pre-op labs reviewed.               Airway      Mallampati: I  Mouth opening: >3 FB  TM distance: 4 - 6 cm  Neck ROM: full Cardiovascular    Cardiovascular exam normal.  Rhythm: regular  Rate: normal     Dental  Comment: No gross abnormalities noted on exam. Patient denies any loose/missing/cracked teeth.              Pulmonary    Pulmonary exam normal.                 Other findings              ASA: 1   Plan: MAC  NPO status verified and patient meets guidelines.    Post-procedure pain management plan discussed with surgeon and patient.    Comment: I explained the intrinsic risks  of MAC anesthesia to Tavo Aguileragiovany including intraoperative awareness/recall, PONV, post-operative pain/discomfort, risk of aspiration, insertion of naso- or oropharyngeal airways, conversion to general anesthesia, dental injury, pressure/nerve injuries from positioning, and other serious but rare complications (life-threatening cardiopulmonary events). All questions were answered and patient demonstrated understanding of realistic expectations and risks of undergoing anesthesia. Informed consent was signed by patient.    Plan/risks discussed with: patient and spouse                Present on Admission:  **None**             [1]    [Transfer Hold] acetaminophen (Tylenol Extra Strength) tab 1,000 mg  1,000 mg Oral Once    [Transfer Hold] scopolamine (Transderm-Scop) 1 MG/3DAYS patch 1 patch  1 patch Transdermal Once    lactated ringers infusion   Intravenous Continuous    [COMPLETED] heparin (Porcine) 5000 UNIT/ML injection 5,000 Units  5,000 Units Subcutaneous Once    ceFAZolin (Ancef) 2g in 10mL IV syringe premix  2 g Intravenous Once   [2]   No medications prior to admission.   [3]   Past Surgical History:  Procedure Laterality Date    Other surgical history  12-9-09    Dr. Corine botello    Upper gi endoscopy,biopsy  2/11/2011   [4]   Social History  Socioeconomic History    Marital status:      Spouse name: Marissa    Number of children: 3   Occupational History    Occupation: RN     Employer: [employer]   Tobacco Use    Smoking status: Never    Smokeless tobacco: Never   Vaping Use    Vaping status: Never Used   Substance and Sexual Activity    Alcohol use: Not Currently     Alcohol/week: 1.0 standard drink of alcohol     Types: 1 Standard drinks or equivalent per week    Drug use: No    Sexual activity: Yes     Partners: Female   Other Topics Concern    Caffeine Concern No    Stress Concern No    Weight Concern No    Special Diet Yes    Exercise Yes    Seat Belt Yes

## 2025-05-06 ENCOUNTER — OFFICE VISIT (OUTPATIENT)
Facility: LOCATION | Age: 40
End: 2025-05-06
Payer: COMMERCIAL

## 2025-05-06 VITALS
SYSTOLIC BLOOD PRESSURE: 123 MMHG | HEART RATE: 75 BPM | OXYGEN SATURATION: 98 % | TEMPERATURE: 98 F | DIASTOLIC BLOOD PRESSURE: 71 MMHG

## 2025-05-06 DIAGNOSIS — Z98.890 POSTOPERATIVE STATE: Primary | ICD-10-CM

## 2025-05-06 DIAGNOSIS — D17.1 LIPOMA OF FLANK: ICD-10-CM

## 2025-05-06 DIAGNOSIS — D17.1 LIPOMA OF ABDOMINAL WALL: ICD-10-CM

## 2025-05-06 PROCEDURE — 3074F SYST BP LT 130 MM HG: CPT

## 2025-05-06 PROCEDURE — 99024 POSTOP FOLLOW-UP VISIT: CPT

## 2025-05-06 PROCEDURE — 3078F DIAST BP <80 MM HG: CPT

## 2025-05-06 NOTE — PROGRESS NOTES
Follow Up Visit Note       Active Problems      1. Postoperative state    2. Lipoma of flank    3. Lipoma of abdominal wall          Chief Complaint   Chief Complaint   Patient presents with    Post-Op     PO - EXCISION OF ABDOMINAL WALL AND RIGHT FLANK LIPOMA W/ RRP 4/18, Incision sites good. No pain or drainage.             History of Present Illness  Tavo is a 40 year old male who underwent excision of abdominal wall and right flank lipoma with Dr. Nina on 4/18/2025. He presents to clinic today for follow up evaluation and suture removal.     He reports he has  been doing well since the procedure. He reports no pain to his incision sites. He denies surrounding erythema, swelling or drainage from the site. He denies fever or chills.     Specimen pathology as below:  A.  Soft tissue, abdominal wall, excision:  - Mature fibroadipose tissue, consistent with lipoma.     B.  Soft tissue, right flank, excision:  - Mature fibroadipose tissue, consistent with lipoma.    Allergies  Tavo has no known allergies.    Past Medical / Surgical / Social / Family History    The past medical and past surgical history have been reviewed by me today.    Past Medical History[1]  Past Surgical History[2]    The family history and social history have been reviewed by me today.    Family History[3]  Social Hx on file[4]   Medications - Current[5]     Review of Systems  The Review of Systems has been reviewed by me during today.  Review of Systems   Constitutional:  Negative for chills, fatigue and fever.   Skin:  Negative for rash and wound.        Physical Findings   /71 (BP Location: Left arm, Patient Position: Sitting, Cuff Size: adult)   Pulse 75   Temp 98.3 °F (36.8 °C) (Temporal)   SpO2 98%   Physical Exam  Vitals reviewed.   Constitutional:       General: He is not in acute distress.     Appearance: Normal appearance. He is not ill-appearing.       HENT:      Head: Normocephalic and atraumatic.   Eyes:      General:  No scleral icterus.     Conjunctiva/sclera: Conjunctivae normal.   Pulmonary:      Effort: Pulmonary effort is normal. No respiratory distress.   Abdominal:       Skin:     General: Skin is warm and dry.      Coloration: Skin is not jaundiced.      Findings: No bruising, erythema or rash.   Neurological:      Mental Status: He is alert.   Psychiatric:         Mood and Affect: Mood normal.         Behavior: Behavior normal.          Assessment   1. Postoperative state    2. Lipoma of flank    3. Lipoma of abdominal wall        Plan   The patient is doing well postoperatively.  Continue Diet as tolerated.  Tylenol and Ibuprofen as needed for pain control. Can use warm or cold compresses for comfort.   Continue local wound care, soap and water to the incisions.   Pathology discussed with the patient.   No further activity restrictions at this time  All the patient's questions and concerns were addressed. They voiced understanding and are in agreement with the plan     Follow Up  They may follow up with [facility] general surgery on an as-needed basis.       Aishwarya Madrigal PA-C           [1]   Past Medical History:   Anorexia    Nausea alone    chronic    Underweight    chronic    Unspecified vitamin D deficiency   [2]   Past Surgical History:  Procedure Laterality Date    Other surgical history  12-9-09    Dr. Corine botello    Upper gi endoscopy,biopsy  2/11/2011   [3]   Family History  Problem Relation Age of Onset    Hypertension Mother     Lipids Father     Other (brain tumor) Maternal Grandmother     Diabetes Paternal Grandfather    [4]   Social History  Socioeconomic History    Marital status:      Spouse name: Marissa    Number of children: 3   Occupational History    Occupation: RN     Employer: [employer]   Tobacco Use    Smoking status: Never    Smokeless tobacco: Never   Vaping Use    Vaping status: Never Used   Substance and Sexual Activity    Alcohol use: Not Currently     Alcohol/week: 1.0  standard drink of alcohol     Types: 1 Standard drinks or equivalent per week    Drug use: No    Sexual activity: Yes     Partners: Female   Other Topics Concern    Caffeine Concern No    Stress Concern No    Weight Concern No    Special Diet Yes    Exercise Yes    Seat Belt Yes   [5] No current outpatient medications on file.

## 2025-06-09 ENCOUNTER — TELEPHONE (OUTPATIENT)
Dept: FAMILY MEDICINE CLINIC | Facility: CLINIC | Age: 40
End: 2025-06-09

## 2025-06-09 NOTE — TELEPHONE ENCOUNTER
Triage     Appointment for: Tavo Breen ([id number])  Visit type: MYCHART EXAM (2964)  6/10/2025 3:40 PM (20 minutes) with Deepthi Garcia in [address]     Patient comments:  Severe back pain

## 2025-06-09 NOTE — TELEPHONE ENCOUNTER
Dr. Garcia: Routing as FYI.   Last office visit 10/7/24. spoke with patient, he is having more occurences of bilateral low back pain with right-sided sciatica and would like further evaluation. Patient has appointment on 6/10/25.     Patient states he is having intermittent, severe back pain that is sharp and shooting which causes him to miss work. Last occurrence was Thursday and Friday of last week. Patient denies back pain today. Reviewed last office visit with Dr. Garcia patient was referred to physical therapy, patient stated he did a couple of sessions. He takes motrin as needed for pain which he reports does not help much. Patient would like [facility] paperwork for work, advised patient he will have to discuss with Dr. Garcia and then a separate forms team does that paperwork for a fee patient verbalized understanding. Advised patient appointment tomorrow with Dr. Garcia is appropriate. Informed patient of emergency room precautions he verbalized understanding.

## 2025-07-02 ENCOUNTER — OFFICE VISIT (OUTPATIENT)
Dept: FAMILY MEDICINE CLINIC | Facility: CLINIC | Age: 40
End: 2025-07-02
Payer: COMMERCIAL

## 2025-07-02 VITALS
HEIGHT: 71 IN | TEMPERATURE: 98 F | WEIGHT: 149 LBS | RESPIRATION RATE: 18 BRPM | HEART RATE: 80 BPM | DIASTOLIC BLOOD PRESSURE: 72 MMHG | SYSTOLIC BLOOD PRESSURE: 120 MMHG | OXYGEN SATURATION: 98 % | BODY MASS INDEX: 20.86 KG/M2

## 2025-07-02 DIAGNOSIS — M54.41 ACUTE RIGHT-SIDED LOW BACK PAIN WITH RIGHT-SIDED SCIATICA: Primary | ICD-10-CM

## 2025-07-02 PROCEDURE — 3074F SYST BP LT 130 MM HG: CPT | Performed by: FAMILY MEDICINE

## 2025-07-02 PROCEDURE — 3078F DIAST BP <80 MM HG: CPT | Performed by: FAMILY MEDICINE

## 2025-07-02 PROCEDURE — 99213 OFFICE O/P EST LOW 20 MIN: CPT | Performed by: FAMILY MEDICINE

## 2025-07-02 PROCEDURE — 3008F BODY MASS INDEX DOCD: CPT | Performed by: FAMILY MEDICINE

## 2025-07-02 NOTE — PROGRESS NOTES
Family Medicine Progress Note  ASSESSMENT AND PLAN:  Tavo Breen is a 40 year old male who is here for:     Tavo was seen today for low back pain and other.    Diagnoses and all orders for this visit:    Acute right-sided low back pain with right-sided sciatica  Severe low back pain radiating to the right leg, consistent with radiculopathy, impairing mobility. Previous imaging showed arthropathy at L5, S1. Current episode may be exacerbated by awkward sleeping position.  - Refer to physical therapy.  - Advise alternating heat and ice for pain management.  - Recommend Motrin as needed for pain.  - Provide work note for one week off to accommodate physical therapy and recovery.  -     Physical Therapy Referral - [facility]       The patient indicates understanding of these issues and agrees to the plan.  Follow-Up: The patient is asked to return in Return in about 1 week (around 7/9/2025), or if symptoms worsen or fail to improve.  .  Deepthi Garcia MD        CC: Low Back Pain     The following individual(s) verbally consented to be recorded using ambient AI listening technology and understand that they can each withdraw their consent to this listening technology at any point by asking the clinician to turn off or pause the recording:    Patient name: Tavo Breen    HPI:     History of Present Illness  Tavo Breen is a 40 year old male who presents with severe back pain radiating to the right leg.    He experiences severe lower back pain that radiates down his right leg. The pain is intermittent, with episodes of severe intensity, and yesterday he was unable to walk due to the pain. He rates the pain as 10, sometimes reaching a level where mobility is significantly impaired.    He has a history of similar back pain episodes occurring two to three years ago, which were initially managed with physical therapy, resulting in improvement. However, the pain has recurred, and he now wears a  brace consistently to manage the symptoms. No numbness in the right leg, no bowel or bladder incontinence.    He did not lift anything or engage in any specific activity before the pain started, suspecting that it may have been triggered by sleeping in an awkward position a few days ago. He had an x-ray in 2023, which showed arthropathy at the L5, S1 level, but the disc space was normal.    He occasionally takes Motrin for pain relief and alternates between heat and ice therapy. 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      ALLERGY:     MEDICATIONS:     Current Medications[1]   Past Medical History[2]   Social History:  Short Social Hx on File[3]     REVIEW OF SYSTEMS:   ROS as documented in HPI    EXAM:   /72   Pulse 80   Temp 98 °F (36.7 °C) (Temporal)   Resp 18   Ht 5' 11\" (1.803 m)   Wt 149 lb (67.6 kg)   SpO2 98%   BMI 20.78 kg/m²   GENERAL: well developed, well nourished,in no apparent distress  SKIN: no rashes,no suspicious lesions  LUNGS: clear to auscultation  CARDIO: RRR without murmur  BACK: no lumbar lordosis, no tenderness to palpation over the lumbar spine, ROM is limited with flexion, SLR positive on the right.  NEURO: bilateral LE strength is normal, normal sensation, DTR 2+      NOTE TO PATIENT: The 21st Century Cures Act makes clinical notes like these available to patients in the interest of transparency. Clinical notes are medical documents used by physicians and care providers to communicate with each other. These documents include medical language and terminology, abbreviations, and treatment information that may sound technical and at times possibly unfamiliar. In addition, at times, the verbiage may appear blunt or direct. These documents are one tool providers use to communicate relevant information and clinical opinions of the care providers in a way that allows common understanding of the clinical  context.      Deepthi Garcia MD           [1]   No current outpatient medications on file.   [2]   Past Medical History:   Anorexia    Nausea alone    chronic    Underweight    chronic    Unspecified vitamin D deficiency   [3]   Social History  Socioeconomic History    Marital status:      Spouse name: Marissa    Number of children: 3   Occupational History    Occupation: RN     Employer: [employer]   Tobacco Use    Smoking status: Never    Smokeless tobacco: Never   Vaping Use    Vaping status: Never Used   Substance and Sexual Activity    Alcohol use: Not Currently     Alcohol/week: 1.0 standard drink of alcohol     Types: 1 Standard drinks or equivalent per week    Drug use: No    Sexual activity: Yes     Partners: Female   Other Topics Concern    Caffeine Concern No    Stress Concern No    Weight Concern No    Special Diet Yes    Exercise Yes    Seat Belt Yes     Social Drivers of Health     Food Insecurity: No Food Insecurity (7/2/2025)    NCSS - Food Insecurity     Worried About Running Out of Food in the Last Year: No     Ran Out of Food in the Last Year: No   Transportation Needs: No Transportation Needs (7/2/2025)    NCSS - Transportation     Lack of Transportation: No   Housing Stability: Not At Risk (7/2/2025)    NCSS - Housing/Utilities     Has Housing: Yes     Worried About Losing Housing: No     Unable to Get Utilities: No

## 2025-08-08 ENCOUNTER — TELEPHONE (OUTPATIENT)
Dept: FAMILY MEDICINE CLINIC | Facility: CLINIC | Age: 40
End: 2025-08-08

## 2025-08-08 DIAGNOSIS — Z00.00 LABORATORY EXAMINATION ORDERED AS PART OF A ROUTINE GENERAL MEDICAL EXAMINATION: ICD-10-CM

## 2025-08-16 ENCOUNTER — LAB ENCOUNTER (OUTPATIENT)
Dept: LAB | Age: 40
End: 2025-08-16
Attending: FAMILY MEDICINE

## 2025-08-16 DIAGNOSIS — Z00.00 LABORATORY EXAMINATION ORDERED AS PART OF A ROUTINE GENERAL MEDICAL EXAMINATION: ICD-10-CM

## 2025-08-16 LAB
ALBUMIN SERPL-MCNC: 4.9 G/DL (ref 3.2–4.8)
ALBUMIN/GLOB SERPL: 1.5 (ref 1–2)
ALP LIVER SERPL-CCNC: 92 U/L (ref 45–117)
ALT SERPL-CCNC: 22 U/L (ref 10–49)
ANION GAP SERPL CALC-SCNC: 13 MMOL/L (ref 0–18)
AST SERPL-CCNC: 20 U/L (ref ?–34)
BASOPHILS # BLD AUTO: 0.04 X10(3) UL (ref 0–0.2)
BASOPHILS NFR BLD AUTO: 0.5 %
BILIRUB SERPL-MCNC: 0.4 MG/DL (ref 0.3–1.2)
BUN BLD-MCNC: 15 MG/DL (ref 9–23)
CALCIUM BLD-MCNC: 10.1 MG/DL (ref 8.7–10.6)
CHLORIDE SERPL-SCNC: 102 MMOL/L (ref 98–112)
CHOLEST SERPL-MCNC: 202 MG/DL (ref ?–200)
CO2 SERPL-SCNC: 27 MMOL/L (ref 21–32)
CREAT BLD-MCNC: 1.14 MG/DL (ref 0.7–1.3)
EGFRCR SERPLBLD CKD-EPI 2021: 83 ML/MIN/1.73M2 (ref 60–?)
EOSINOPHIL # BLD AUTO: 0.22 X10(3) UL (ref 0–0.7)
EOSINOPHIL NFR BLD AUTO: 3 %
ERYTHROCYTE [DISTWIDTH] IN BLOOD BY AUTOMATED COUNT: 14 %
FASTING PATIENT LIPID ANSWER: YES
FASTING STATUS PATIENT QL REPORTED: YES
GLOBULIN PLAS-MCNC: 3.2 G/DL (ref 2–3.5)
GLUCOSE BLD-MCNC: 80 MG/DL (ref 70–99)
HCT VFR BLD AUTO: 44 % (ref 39–53)
HDLC SERPL-MCNC: 41 MG/DL (ref 40–59)
HGB BLD-MCNC: 13.6 G/DL (ref 13–17.5)
IMM GRANULOCYTES # BLD AUTO: 0.02 X10(3) UL (ref 0–1)
IMM GRANULOCYTES NFR BLD: 0.3 %
LDLC SERPL CALC-MCNC: 138 MG/DL (ref ?–100)
LYMPHOCYTES # BLD AUTO: 2.64 X10(3) UL (ref 1–4)
LYMPHOCYTES NFR BLD AUTO: 36 %
MCH RBC QN AUTO: 24.7 PG (ref 26–34)
MCHC RBC AUTO-ENTMCNC: 30.9 G/DL (ref 31–37)
MCV RBC AUTO: 80 FL (ref 80–100)
MONOCYTES # BLD AUTO: 0.39 X10(3) UL (ref 0.1–1)
MONOCYTES NFR BLD AUTO: 5.3 %
NEUTROPHILS # BLD AUTO: 4.03 X10 (3) UL (ref 1.5–7.7)
NEUTROPHILS # BLD AUTO: 4.03 X10(3) UL (ref 1.5–7.7)
NEUTROPHILS NFR BLD AUTO: 54.9 %
NONHDLC SERPL-MCNC: 161 MG/DL (ref ?–130)
OSMOLALITY SERPL CALC.SUM OF ELEC: 294 MOSM/KG (ref 275–295)
PLATELET # BLD AUTO: 188 10(3)UL (ref 150–450)
PLATELETS.RETICULATED NFR BLD AUTO: 11.6 % (ref 0–7)
POTASSIUM SERPL-SCNC: 4 MMOL/L (ref 3.5–5.1)
PROT SERPL-MCNC: 8.1 G/DL (ref 5.7–8.2)
RBC # BLD AUTO: 5.5 X10(6)UL (ref 4.3–5.7)
SODIUM SERPL-SCNC: 142 MMOL/L (ref 136–145)
TRIGL SERPL-MCNC: 126 MG/DL (ref 30–149)
TSI SER-ACNC: 1.24 UIU/ML (ref 0.55–4.78)
VLDLC SERPL CALC-MCNC: 23 MG/DL (ref 0–30)
WBC # BLD AUTO: 7.3 X10(3) UL (ref 4–11)

## 2025-08-16 PROCEDURE — 85025 COMPLETE CBC W/AUTO DIFF WBC: CPT

## 2025-08-16 PROCEDURE — 80061 LIPID PANEL: CPT

## 2025-08-16 PROCEDURE — 80053 COMPREHEN METABOLIC PANEL: CPT

## 2025-08-16 PROCEDURE — 36415 COLL VENOUS BLD VENIPUNCTURE: CPT

## 2025-08-16 PROCEDURE — 84443 ASSAY THYROID STIM HORMONE: CPT

## 2025-08-19 ENCOUNTER — OFFICE VISIT (OUTPATIENT)
Dept: FAMILY MEDICINE CLINIC | Facility: CLINIC | Age: 40
End: 2025-08-19

## 2025-08-19 VITALS
HEIGHT: 71 IN | SYSTOLIC BLOOD PRESSURE: 118 MMHG | HEART RATE: 75 BPM | BODY MASS INDEX: 20.58 KG/M2 | WEIGHT: 147 LBS | OXYGEN SATURATION: 99 % | DIASTOLIC BLOOD PRESSURE: 78 MMHG | TEMPERATURE: 98 F | RESPIRATION RATE: 18 BRPM

## 2025-08-19 DIAGNOSIS — Z00.00 ROUTINE GENERAL MEDICAL EXAMINATION AT A HEALTH CARE FACILITY: Primary | ICD-10-CM

## 2025-08-19 DIAGNOSIS — M54.50 CHRONIC BILATERAL LOW BACK PAIN WITHOUT SCIATICA: ICD-10-CM

## 2025-08-19 DIAGNOSIS — D69.6 THROMBOCYTOPENIA: ICD-10-CM

## 2025-08-19 DIAGNOSIS — E78.00 PURE HYPERCHOLESTEROLEMIA: ICD-10-CM

## 2025-08-19 DIAGNOSIS — G89.29 CHRONIC BILATERAL LOW BACK PAIN WITHOUT SCIATICA: ICD-10-CM

## (undated) DEVICE — SLEEVE COMPR MD KNEE LEN SGL USE KENDALL SCD

## (undated) DEVICE — SUT ETHLN 3-0 18IN PS-2 NABSRB BLK 19MM 3/8 C

## (undated) DEVICE — SOLUTION IRRIG 1000ML 0.9% NACL USP BTL

## (undated) DEVICE — MINI LAP PACK-LF: Brand: MEDLINE INDUSTRIES, INC.

## (undated) DEVICE — SUT MCRYL 4-0 18IN PS-2 ABSRB UD 19MM 3/8 CIR

## (undated) DEVICE — GLOVE SUR 7.5 SENSICARE PI PIP CRM PWD F

## (undated) DEVICE — APPLICATOR PREP 26ML CHG 2% ISO ALC 70%

## (undated) NOTE — LETTER
[facility] CARE IN [address]  [place] [address]  Dept: [phone]  Dept Fax: [phone]         January 13, 2020    Patient: Jolanta Larose   YOB: 1985   Date of Visit: 1/13/2020       To Whom It May Leia

## (undated) NOTE — LETTER
Date & Time: 6/24/2022, 6:26 PM  Patient: Ginny Kelley  Encounter Provider(s):    DEJAN Holliday       To Whom It May Concern:    Ginny Kelley was seen and treated in our department on 6/24/2022.  He may return to work 6/28/22    If you have any questions or concerns, please do not hesitate to call.        _____________________________  Physician/APC Signature

## (undated) NOTE — LETTER
Date: 4/18/2025    Patient Name: Tavo Breen          To Whom it may concern:    This letter has been written at the patient's request. The above patient was seen at [facility] for treatment of a medical condition.    This patient should be excused from attending work from 4/18/25 through 4/24/25.    The patient may return to work on 4/25/25.        Sincerely,    Dr. Nina

## (undated) NOTE — Clinical Note
Date: 4/18/2025    Patient Name: Tavo Breen          To Whom it may concern:    This letter has been written at the patient's request. The above patient was seen at [facility] for treatment of a medical condition.    This patient should be excused from attending work/school from *** through ***.    The patient may return to work/school on *** with the following limitations ***.        Sincerely,    No name on file

## (undated) NOTE — LETTER
Date & Time: 5/20/2021, 9:05 AM  Patient: Rayaarabella Harper  Encounter Provider(s): Gerson Bower MD       To Whom It May Concern:    Raya Harper was seen and treated in our department on 5/20/2021. .    If you have any questions or concerns

## (undated) NOTE — LETTER
Date & Time: 6/24/2022, 6:21 PM  Patient: Sonja Mcpherson  Encounter Provider(s):    DEJAN Lopez       To Whom It May Concern:    Sonja Mcpherson was seen and treated in our department on 6/24/2022.  He can return to work 6/25/22  If you have any questions or concerns, please do not hesitate to call.        _____________________________  Physician/APC Signature

## (undated) NOTE — MR AVS SNAPSHOT
Ruy Daley [address] [place] [id number]  [phone]               Thank you for choosing us for your health care visit with Lorelei Apgar, MD.  We are glad to serve you and happy to provide you with this PH: [phone]    Hema Marrufo 71        [facility]:  [address] [address] in [facility] in [address] [place] [address], [address] [place] [address], [facility] [address]    [address]   [address] help ease discomfort. Some also reduce swelling. · Tell your healthcare provider about any medicines you are already taking. · Take medicines only as directed. ? Heat  After the first 48 hours, heat can relax sore muscles and improve blood flow.   · Try office, you can view your past visit information in [facility] by going to Visits < Visit Summaries. [employer] questions? Call [phone] for help. [employer] is NOT to be used for urgent needs. For medical emergencies, dial 911.         Educational Inform

## (undated) NOTE — LETTER
Date & Time: 10/29/2023, 10:08 AM  Patient: Eric Paez  Encounter Provider(s):    RICARDO Caballero       To Whom It May Concern:    Eric Paez was seen and treated in our department on 10/29/2023. He should not return to work until symptoms are improving .     If you have any questions or concerns, please do not hesitate to call.        _____________________________  Physician/APC Signature

## (undated) NOTE — LETTER
Date & Time: 1/15/2025, 11:56 AM  Patient: Tavo Breen  Encounter Provider(s):    Rancho Mendez MD       To Whom It May Concern:    Tavo Breen was seen and treated in our department on 1/15/2025. He should not return to work until 1/20/2025 .    If you have any questions or concerns, please do not hesitate to call.        _____________________________  Physician/APC Signature

## (undated) NOTE — ED AVS SNAPSHOT
Robbie Womack   MRN: [id number]    Department:  [facility] Emergency Department   Date of Visit:  10/7/2017           Disclosure     Insurance plans vary and the physician(s) referred by the ER may not be covered by your plan.  Please contact If you have been prescribed any medication(s), please fill your prescription right away and begin taking the medication(s) as directed    If the emergency physician has read X-rays, these will be re-interpreted by a radiologist.  If there is a significant

## (undated) NOTE — LETTER
Date: 7/2/2025    Patient Name: Tavo Breen          To Whom it may concern:    The above patient was seen at [facility] for treatment of a medical condition.    This patient should be excused from attending work/school from 07/02/2025 through 07/08/2025.    The patient may return to work/school on 07/09/2025.    Please do not hesitate to contact my office with any concerns.      Sincerely,      Deepthi Garcia MD

## (undated) NOTE — LETTER
Date: 4/18/2025    Patient Name: Tavo Breen          To Whom it may concern:    This letter has been written at the patient's request. The above patient was seen at [facility] for treatment of a medical condition.    This patient should be excused from attending work/school from *** through ***.    The patient may return to work/school on *** with the following limitations ***.        Sincerely,    No name on file

## (undated) NOTE — LETTER
Date & Time: 11/1/2019, 11:00 AM  Patient: Hyacinth Carranza  Encounter Provider(s):    Melody Matthews MD       To Whom It May Concern:    Hyacinth Carranza was seen and treated in our department on 11/1/2019. He can return to work.     If you have an

## (undated) NOTE — ED AVS SNAPSHOT
Lani Ferrara   MRN: [id number]    Department:  [facility] Emergency Department   Date of Visit:  5/24/2018           Disclosure     Insurance plans vary and the physician(s) referred by the ER may not be covered by your plan.  Please contact tell this physician (or your personal doctor if your instructions are to return to your personal doctor) about any new or lasting problems. The primary care or specialist physician will see patients referred from the [facility] Emergency Department.  Jodie Ng

## (undated) NOTE — LETTER
Vernell Varma, [address]       03/31/18        Patient: Moira Morin   YOB: 1985   Date of Visit: 3/31/2018       Dear  Dr. Elza Vaz MD,      Thank you for referring Alvarado Howe

## (undated) NOTE — ED AVS SNAPSHOT
[facility] Emergency Department    [place]  [address]    [address] [place] [address]    Phone:  [phone]    Fax:  [phone] [facility]   MRN: [id number]    Department:  [facility] Emergency Department   Date of Visit:  6 06/02/2017  22:50 ondansetron HCl (ZOFRAN) injection 4 mg 4 mg                Admin Date Administration Dose                   06/02/2017  23:45 HYDROmorphone HCl PF (DILAUDID) 1 MG/ML injection 0.5 mg 0.5 mg                     Medication Information to a primary care or a specialist physician for a follow-up visit, please tell this physician (or your personal doctor if your instructions are to return to your personal doctor) about any new or lasting problems.  The primary care or specialist physician w We are concerned for your overall well being:    - If you are a smoker or have smoked in the last 12 months, we encourage you to explore options for quitting.     - If you have concerns related to behavioral health issues or thoughts of harming yourself,

## (undated) NOTE — LETTER
[facility] CARE IN [place]  [id number] [address]  Dept: [phone]  Dept Fax: [phone]      July 2, 2017    Patient: Behzad Garcia   Date of Visit: 7/2/2017       To Whom It May Concern:    Behzad Garcia was seen a

## (undated) NOTE — Clinical Note
I had the pleasure of seeing Mr. Breen in my office today.  Please see my attached note.      Jesus Nina

## (undated) NOTE — ED AVS SNAPSHOT
[facility] Emergency Department    [place]  [address] [address]    Phone:  [phone]    Fax:  [phone] [facility]   MRN: [id number]    Department:  [facility] Emergency Department   Date of Visit:  6 IF THERE IS ANY CHANGE OR WORSENING OF YOUR CONDITION, CALL YOUR PRIMARY CARE PHYSICIAN AT ONCE OR RETURN IMMEDIATELY TO THE EMERGENCY DEPARTMENT.     If you have been prescribed any medication(s), please fill your prescription right away and begin taking t

## (undated) NOTE — LETTER
Date & Time: 12/30/2024, 10:42 AM  Patient: Tavo Breen  Encounter Provider(s):    Mesfin Coleman MD       To Whom It May Concern:    Tavo Breen was seen and treated in our department on 12/30/2024. He should not return to work until 01/03/2025 .    If you have any questions or concerns, please do not hesitate to call.        _____________________________  Physician/APC Signature

## (undated) NOTE — LETTER
[facility] CARE IN [address]  Dept: [phone]  Dept Fax: [phone]         January 2, 2018    Patient: Maddison Mello   YOB: 1985   Date of Visit: 1/2/2018       To Whom It May Concern

## (undated) NOTE — LETTER
[facility] CARE IN [place] [address] [address]  Dept: [phone]  Dept Fax: [phone]      September 30, 2017    Patient: Fareed Christopher   Date of Visit: 9/30/2017       To Whom It May Concern:    Fareed Christopher was

## (undated) NOTE — Clinical Note
Date: 6/2/2017    Patient Name: Maddison Mello          To Whom it may concern:    Libertad Coe was seen at the [facility] for treatment of a medical condition. Has acute back pain.     This patient should be excused from attending work 6/2/17 t

## (undated) NOTE — ED AVS SNAPSHOT
[facility] in [address] [place] [address] Drive,[address] Floor    [address] [place] [address]    Phone:  [phone]    Fax:  [phone]           Fareed    MRN: [id number]    Department:  THE [facility] OF [facility] in [facility] END   Date of Visit:  4/30/2017 [facility]  [address] [facility], [address]  [phone] [facility]  [address]ma, [address]  [phone] [facility] Immediate Care  [address] 600 [facility] Proc. Vini Ortiz 1   [phone] re-interpreted by a radiologist.  If there is a significant change in your reading, you will be contacted. Please make sure we have your correct phone number before you leave. After you leave, you should follow the attached instructions.      I have read an and ask to get set up for an insurance coverage that is in-network with Hema Ling.         MyChart     Visit [employer]  You can access your Epic[employer]ledgehart to more actively manage your health care and view more details from this visit by going to https:/